# Patient Record
Sex: FEMALE | Race: WHITE | NOT HISPANIC OR LATINO | Employment: OTHER | URBAN - METROPOLITAN AREA
[De-identification: names, ages, dates, MRNs, and addresses within clinical notes are randomized per-mention and may not be internally consistent; named-entity substitution may affect disease eponyms.]

---

## 2017-05-16 ENCOUNTER — APPOINTMENT (OUTPATIENT)
Dept: LAB | Facility: CLINIC | Age: 57
End: 2017-05-16
Attending: INTERNAL MEDICINE
Payer: COMMERCIAL

## 2017-05-16 ENCOUNTER — OFFICE VISIT (OUTPATIENT)
Dept: TRANSPLANT | Facility: CLINIC | Age: 57
End: 2017-05-16
Attending: INTERNAL MEDICINE
Payer: COMMERCIAL

## 2017-05-16 VITALS
SYSTOLIC BLOOD PRESSURE: 127 MMHG | DIASTOLIC BLOOD PRESSURE: 57 MMHG | WEIGHT: 138.4 LBS | RESPIRATION RATE: 18 BRPM | OXYGEN SATURATION: 99 % | HEART RATE: 63 BPM | BODY MASS INDEX: 23.94 KG/M2 | TEMPERATURE: 98.6 F

## 2017-05-16 DIAGNOSIS — C77.9 MALIGNANT MELANOMA METASTATIC TO LYMPH NODE (H): Primary | ICD-10-CM

## 2017-05-16 LAB
ALBUMIN SERPL-MCNC: 3.1 G/DL (ref 3.4–5)
ALP SERPL-CCNC: 46 U/L (ref 40–150)
ALT SERPL W P-5'-P-CCNC: 22 U/L (ref 0–50)
ANION GAP SERPL CALCULATED.3IONS-SCNC: 8 MMOL/L (ref 3–14)
AST SERPL W P-5'-P-CCNC: 17 U/L (ref 0–45)
BASOPHILS # BLD AUTO: 0 10E9/L (ref 0–0.2)
BASOPHILS NFR BLD AUTO: 0.5 %
BILIRUB SERPL-MCNC: 0.5 MG/DL (ref 0.2–1.3)
BUN SERPL-MCNC: 11 MG/DL (ref 7–30)
CALCIUM SERPL-MCNC: 9.3 MG/DL (ref 8.5–10.1)
CHLORIDE SERPL-SCNC: 106 MMOL/L (ref 94–109)
CO2 SERPL-SCNC: 23 MMOL/L (ref 20–32)
CREAT SERPL-MCNC: 0.56 MG/DL (ref 0.52–1.04)
DIFFERENTIAL METHOD BLD: NORMAL
EOSINOPHIL # BLD AUTO: 0.1 10E9/L (ref 0–0.7)
EOSINOPHIL NFR BLD AUTO: 2.3 %
ERYTHROCYTE [DISTWIDTH] IN BLOOD BY AUTOMATED COUNT: 12.4 % (ref 10–15)
GFR SERPL CREATININE-BSD FRML MDRD: ABNORMAL ML/MIN/1.7M2
GLUCOSE SERPL-MCNC: 79 MG/DL (ref 70–99)
HCT VFR BLD AUTO: 37.8 % (ref 35–47)
HGB BLD-MCNC: 12.5 G/DL (ref 11.7–15.7)
IMM GRANULOCYTES # BLD: 0 10E9/L (ref 0–0.4)
IMM GRANULOCYTES NFR BLD: 0.3 %
LDH SERPL L TO P-CCNC: 136 U/L (ref 81–234)
LYMPHOCYTES # BLD AUTO: 1.3 10E9/L (ref 0.8–5.3)
LYMPHOCYTES NFR BLD AUTO: 20.8 %
MCH RBC QN AUTO: 29.8 PG (ref 26.5–33)
MCHC RBC AUTO-ENTMCNC: 33.1 G/DL (ref 31.5–36.5)
MCV RBC AUTO: 90 FL (ref 78–100)
MONOCYTES # BLD AUTO: 0.6 10E9/L (ref 0–1.3)
MONOCYTES NFR BLD AUTO: 9.6 %
NEUTROPHILS # BLD AUTO: 4 10E9/L (ref 1.6–8.3)
NEUTROPHILS NFR BLD AUTO: 66.5 %
NRBC # BLD AUTO: 0 10*3/UL
NRBC BLD AUTO-RTO: 0 /100
PLATELET # BLD AUTO: 227 10E9/L (ref 150–450)
POTASSIUM SERPL-SCNC: 3.8 MMOL/L (ref 3.4–5.3)
PROT SERPL-MCNC: 6.2 G/DL (ref 6.8–8.8)
RBC # BLD AUTO: 4.19 10E12/L (ref 3.8–5.2)
SODIUM SERPL-SCNC: 138 MMOL/L (ref 133–144)
WBC # BLD AUTO: 6 10E9/L (ref 4–11)

## 2017-05-16 PROCEDURE — 80053 COMPREHEN METABOLIC PANEL: CPT | Performed by: INTERNAL MEDICINE

## 2017-05-16 PROCEDURE — 85025 COMPLETE CBC W/AUTO DIFF WBC: CPT | Performed by: INTERNAL MEDICINE

## 2017-05-16 PROCEDURE — 99212 OFFICE O/P EST SF 10 MIN: CPT | Mod: ZF

## 2017-05-16 PROCEDURE — 83615 LACTATE (LD) (LDH) ENZYME: CPT | Performed by: INTERNAL MEDICINE

## 2017-05-16 ASSESSMENT — PAIN SCALES - GENERAL: PAINLEVEL: NO PAIN (0)

## 2017-05-16 NOTE — NURSING NOTE
"Oncology Rooming Note    May 16, 2017 3:20 PM   Kiran Espino is a 56 year old female who presents for:    Chief Complaint   Patient presents with     Blood Draw     Pt with 22ga IV angio already placed from CT scan, labs drawn from IV, IV discontinued, vitals completed, MD appt not until 3pm, instructed to check back in for appointment when she returns to clinic.     Oncology Clinic Visit     Patient with Melanoma here for provider visit and lab review      Initial Vitals: /57 (BP Location: Right arm)  Pulse 63  Temp 98.6  F (37  C) (Oral)  Resp 18  Wt 62.8 kg (138 lb 6.4 oz)  SpO2 99%  BMI 23.94 kg/m2 Estimated body mass index is 23.94 kg/(m^2) as calculated from the following:    Height as of 11/9/16: 1.619 m (5' 3.75\").    Weight as of this encounter: 62.8 kg (138 lb 6.4 oz). Body surface area is 1.68 meters squared.  No Pain (0) Comment: Data Unavailable   No LMP recorded.  Allergies reviewed: Yes  Medications reviewed: Yes    Medications: Medication refills not needed today.  Pharmacy name entered into [employer]):    [facility] - [place], [place] - [address] AT [facility] [address] & [facility] - [place], [place] - [address] [place] [address]    Clinical concerns:     5 minutes for nursing intake (face to face time)     Thelma Perez CMA              "

## 2017-05-16 NOTE — NURSING NOTE
Chief Complaint   Patient presents with     Blood Draw     Pt with 22ga IV angio already placed from CT scan, labs drawn from IV, IV discontinued, vitals completed, MD appt not until 3pm, instructed to check back in for appointment when she returns to clinic.   Emilia Adams,RN

## 2017-05-16 NOTE — MR AVS SNAPSHOT
After Visit Summary   5/16/2017    Kiran Espino    MRN: [id number]           Patient Information     Date Of Birth          1960        Visit Information        Provider Department      5/16/2017 3:00 PM Tashi Becker MD [facility] Blood and Marrow Transplant        Today's Diagnoses     Malignant melanoma metastatic to lymph node (H)    -  1          [facility] ([facility])  [address] [place] [id number]  Phone: [phone]  Clinic Hours:   Monday-Thursday: 7am to 7pm   Friday: 7am to 5:30pm   Weekends and holidays:    8am to noon (in general)  If your fever is 100.5  or greater,   call the clinic.  After hours call the   hospital at [phone] or   [phone]. Ask for the BMT   fellow on-call            Follow-ups after your visit        Future tests that were ordered for you today     Open Future Orders        Priority Expected Expires Ordered    CBC with platelets differential Routine 11/7/2017 5/16/2018 5/16/2017    Comprehensive metabolic panel Routine 11/7/2017 5/16/2018 5/16/2017    Lactate Dehydrogenase Routine 11/7/2017 5/16/2018 5/16/2017    CT Chest Abdomen Pelvis w/o & w Contrast Routine 11/7/2017 5/16/2018 5/16/2017            Who to contact     If you have questions or need follow up information about today's clinic visit or your schedule please contact [facility] B[facility]OD [facility] M[facility]OW TRANSPLANT directly at [phone].  Normal or non-critical lab and imaging results will be communicated to you by MyChart, letter or phone within 4 business days after the clinic has received the results. If you do not hear from us within 7 days, please contact the clinic through [employer] or phone. If you have a critical or abnormal lab result, we will notify you by phone as soon as possible.  Submit refill requests through [employer] or call your pharmacy and they will forward the refill request to us. Please allow 3 business days for your refill to be completed.           Additional Information About Your Visit        [employer] Information     [employer] ([employer]) gives you secure access to your electronic health record. If you see a primary care provider, you can also send messages to your care team and make appointments. If you have questions, please call your primary care clinic.  If you do not have a primary care provider, please call [phone] and they will assist you.        Care EveryWhere ID     This is your Care EveryWhere ID. This could be used by other organizations to access your [facility] medical records  [id number]        Your Vitals Were     Pulse Temperature Respirations Pulse Oximetry BMI (Body Mass Index)       63 98.6  F (37  C) (Oral) 18 99% 23.94 kg/m2        Blood Pressure from Last 3 Encounters:   05/16/17 127/57   11/09/16 115/74   05/03/16 118/70    Weight from Last 3 Encounters:   05/16/17 62.8 kg (138 lb 6.4 oz)   11/09/16 59.8 kg (131 lb 12.8 oz)   05/03/16 63.1 kg (139 lb 3.2 oz)              We Performed the Following     CBC with platelets differential     Comprehensive metabolic panel     Lactate Dehydrogenase        Recent Review Flowsheet Data     BMT Recent Results Latest Ref Rng & Units 5/22/2014 10/16/2014 4/30/2015 11/6/2015 5/3/2016 11/9/2016 5/16/2017    WBC 4.0 - 11.0 10e9/L 6.3 6.8 7.5 6.8 7.8 6.2 6.0    Hemoglobin 11.7 - 15.7 g/dL 13.2 13.1 13.0 13.8 12.1 13.8 12.5    Platelet Count 150 - 450 10e9/L 264 241 313 272 265 281 227    Neutrophils (Absolute) 1.6 - 8.3 10e9/L 4.4 4.0 4.7 4.5 5.6 4.2 4.0    INR 0.86 - 1.14 - - - - - - -    Sodium 133 - 144 mmol/L 139 137 136 138 136 140 138    Potassium 3.4 - 5.3 mmol/L 4.1 4.2 4.0 4.2 3.9 4.5 3.8    Chloride 94 - 109 mmol/L 107 107 105 107 104 108 106    Glucose 70 - 99 mg/dL 59(L) 80 86 82 80 82 79    Urea Nitrogen 7 - 30 mg/dL 12 14 11 13 11 11 11    Creatinine 0.52 - 1.04 mg/dL 0.65 0.71 0.62 0.59 0.68 0.71 0.56    Calcium (Total) 8.5 - 10.1 mg/dL 10.8(H) 10.1 10.6(H) 10.0 9.4 10.6(H) 9.3    Protein  (Total) 6.8 - 8.8 g/dL 6.8 6.7(L) 6.8 7.0 6.2(L) 6.9 6.2(L)    Albumin 3.4 - 5.0 g/dL 3.9 3.4(L) 3.6 3.6 3.1(L) 3.5 3.1(L)    Alkaline Phosphatase 40 - 150 U/L 50 53 51 56 38(L) 45 46    AST 0 - 45 U/L 22 9 17 16 12 15 17    ALT 0 - 50 U/L 33 30 30 39 25 26 22    MCV 78 - 100 fl 89 89 88 89 90 89 90               Primary Care Provider Office Phone # Fax #    Tashi Becker [phone]       [address]    [facility] [address]        Thank you!     Thank you for choosing [facility] BLOOD AND MARROW TRANSPLANT  for your care. Our goal is always to provide you with excellent care. Hearing back from our patients is one way we can continue to improve our services. Please take a few minutes to complete the written survey that you may receive in the mail after your visit with us. Thank you!             Your Updated Medication List - Protect others around you: Learn how to safely use, store and throw away your medicines at www.disposemymeds.org.          This list is accurate as of: 5/16/17  3:43 PM.  Always use your most recent med list.                   Brand Name Dispense Instructions for use    AZURETTE PO      Take  by mouth daily.       CALCIUM 1200 PO      Take  by mouth daily.       iohexol 140 MG/ML Soln solution    OMNIPAQUE    50 mL    Mix entire bottle (50ml) of contast with 600ml (20 ounces) of water and drink half 2 hrs prior to CT scan and half 1 hr prior to scan       ranitidine 75 MG tablet   Generic drug:  ranitidine      Take 75 mg by mouth daily as needed       vitamin C 500 MG Chew      Take 1,000 mg by mouth daily.       vitamin E 400 UNIT capsule      Take  by mouth daily.

## 2017-05-16 NOTE — PROGRESS NOTES
Cutaneous Oncology Evaluation      Kiran Espino is a 56 year old female referred by Dr. Sifuentes for melanoma.      Melanoma diagnostic information:   08/2012, in the shower, noticed a lump in her left groin.  There was no pain associated with it, no induration, no redness.  She reportedly went on a trip to [place] after she initially noticed it.  This eventually grew, and then she saw Dr. Santillan, who performed an excisional biopsy.  The mass about 2.5 cm.  It came back consistent with melanoma.  The pathology was reviewed reportedly both at [facility], as well as here at the [facility].  On retrospect, she reportedly had a biopsy done at the [facility], which was a lentiginous junctional nevus.  The lymph node was 2 x 1.6 x 1.5 cm.  She was then referred to Dr. Sergio Raines, who then performed an excisional lymph node removal.  All the lymph nodes came back negative for melanoma.  In the interval, she also saw Dermatology, who did perform a thorough skin check, as well as an anorectal examination, and no suspicious lesions were found.  During this time frame, she was also evaluated at [facility] for a second opinion, who concurred with performing a lymph node removal from the inguinal area.  She also had a screening colonoscopy, Pap smear and mammogram, which were all negative.            HPI:  Please see my entry above for oncologic history.  Ms. Espino is doing very well. She was last seen here about 6 months ago.  She is doing extremely well.  She is doing routine skin exams.  No fevers, chills, sweats, weight loss, bone pain, nausea, vomiting, diarrhea, skin rash, or any other new symptoms.      ASSESSMENT AND PLAN:  Melanoma of unknown primary site - Ms. Espino  is a 56 year old female with a history of melanoma with unknown primary site. She underwent lymph node excision with 12/12 negative lymph nodes in 2012. She has since been followed with surveillance. Her scans today are negative for  any recurrent or metastatic disease. We discussed with her in detail the nature of melanoma and it is important to continues to have her twice a year skin assessments by dermatology. From a melanoma standpoint, her negative scans are encouraging. We will continue with every six month CT scans through 5 years.  Her next scan should be her final one, assuming she continues to do so well.    She knows to return sooner if any new or concerning symptoms.  Multiple questions answered.    ROS:   Skin: negative  Eyes: negative  Ears/Nose/Throat: negative  Respiratory: No shortness of breath, dyspnea on exertion, cough, or hemoptysis  Cardiovascular: negative  Gastrointestinal: negative  Genitourinary: negative  Musculoskeletal: negative  Neurologic: negative  Psychiatric: negative  Hematologic/Lymphatic/Immunologic: negative  Endocrine: negative        Past Medical History:   Diagnosis Date     Irregular heart beat     'benign' per pt     Malignant neoplasm (H)        Past Surgical History:   Procedure Laterality Date     ENDOSCOPIC DISSECTION FEMORAL LYMPH NODE  12/20/2012    Procedure: ENDOSCOPIC DISSECTION FEMORAL LYMPH NODE;  Left Endoscopic Femoral Lymph Node Dissection converted to open @ 1404;  Surgeon: Sergio Raines MD;  Location: [facility] OR     EXCISE MASS GROIN  11/5/2012    Procedure: EXCISE MASS GROIN;  Left Groin exploration, Excision lymph node biopsy;  Surgeon: Hedy Santillan MD;  Location: [facility] OR     HEAD & NECK SURGERY  2000    cystectomy on right        No family history on file.    Social History   Substance Use Topics     Smoking status: Never Smoker     Smokeless tobacco: Never Used     Alcohol use 3.5 oz/week     7 Glasses of wine per week      Comment: 7/wk         Allergies   Allergen Reactions     Nka [No Known Allergies]      Nkda [No Known Drug Allergies]         Current Outpatient Prescriptions   Medication Sig Dispense Refill     iohexol (OMNIPAQUE) 140 MG/ML SOLN Mix entire bottle (50ml) of  contast with 600ml (20 ounces) of water and drink half 2 hrs prior to CT scan and half 1 hr prior to scan 50 mL 3     ranitidine (ZANTAC 75) 75 MG tablet Take 75 mg by mouth daily as needed       [DISCONTINUED] warfarin (COUMADIN) 5 MG tablet Take by mouth daily TAKE 5 MG DAILY AND ON MONDAYS TAKE 7.5MG       Ascorbic Acid (VITAMIN C) 500 MG CHEW Take 1,000 mg by mouth daily.       vitamin E 400 UNIT capsule Take  by mouth daily.       Desogestrel-Ethinyl Estradiol (AZURETTE PO) Take  by mouth daily.       Calcium Carbonate-Vit D-Min (CALCIUM 1200 PO) Take  by mouth daily.           Physical Exam:     Vital Signs: /57 (BP Location: Right arm)  Pulse 63  Temp 98.6  F (37  C) (Oral)  Resp 18  Wt 62.8 kg (138 lb 6.4 oz)  SpO2 99%  BMI 23.94 kg/m2        KPS:  100%    General Appearance: healthy, alert and no distress  Eyes: PERRL, conjunctiva and lids normal, sclera nonicteric  Ears/Nose/M/Throat: Oral mucosa and posterior oropharynx normal, moist mucous membranes  Neck supple, non-tender, free range of motion, no adenopathy  Cardio/Vascular:regular rate and rhythm, normal S1 and S2, no murmur  Resp Effort And Auscultation: Normal - Clear to auscultation without rales, rhonchi, or wheezing.  GI: soft, nontender, bowel sounds present in all four quadrants, no hepatosplenomegaly  Lymphatics:no significant enlargement of lymph nodes globally   Musculoskeletal: Musculoskeletal normal  Edema: none  Skin: Skin color, texture, turgor normal. No rashes or lesions.  Neurologic: Gait normal. Reflexes normal and symmetric. Sensation grossly WNL.  Psych/Affect: Mood and affect are appropriate.  Vascular Access:  None        Active Problems:     Patient Active Problem List   Diagnosis     Melanoma (H)     Advance care planning     DVT (deep venous thrombosis) (H)       CBC with Differential    Recent Labs   Lab Test  05/16/17   1143  11/09/16   1120  05/03/16   1129   WBC  6.0  6.2  7.8   ANEU  4.0  4.2  5.6   ALYM  1.3   1.4  1.4   ANA MARIA  0.6  0.5  0.6   AEOS  0.1  0.1  0.1   HGB  12.5  13.8  12.1   HCT  37.8  40.9  36.3   PLT  227  281  265        Chemistries    Recent Labs   Lab Test  05/16/17   1143  11/09/16   1120  05/03/16   1129   NA  138  140  136   POTASSIUM  3.8  4.5  3.9   CHLORIDE  106  108  104   CO2  23  26  23   BUN  11  11  11   CR  0.56  0.71  0.68   GLC  79  82  80         LFTs    Recent Labs   Lab Test  05/16/17   1143  11/09/16   1120  05/03/16   1129   BILITOTAL  0.5  0.5  0.4   ALKPHOS  46  45  38*   AST  17  15  12   ALT  22  26  25   ALBUMIN  3.1*  3.5  3.1*        LDH    Recent Labs   Lab Test  05/16/17   1143  11/09/16   1120  05/03/16   1129   LDH  136  116  130        CT C/A/P    Results for orders placed in visit on 05/16/17   CT CHEST/ABDOMEN/PELVIS W CONTRAST    Status: Normal 5/16/2017    Narrative EXAMINATION: CT CHEST/ABDOMEN/PELVIS W CONTRAST, 5/16/2017 11:31 AM    TECHNIQUE:  Helical CT images from the thoracic inlet through the  symphysis pubis were obtained with contrast in the portal venous  phase. Arterial phase imaging through the liver. Contrast dose: Isovue  370 80 cc.    COMPARISON: 11/9/2016, 5/3/2016, 11/6/2015, 4/30/2015, 5/21/2014.    HISTORY: Surveillance scan for hx of melanoma of L groin, Malignant  melanoma of skin.    FINDINGS:  Chest:   Unchanged subcentimeter hypodensities in the right thyroid lobe. The  aortic branching pattern, heart size, pericardium, and esophagus are  unremarkable. The ascending aorta and main pulmonary artery are not  enlarged. No large central pulmonary embolism. Stable prominent  subcentimeter mediastinal lymphadenopathy, for example precarinal  lymph node which again measures 7 mm short axis. No lymphadenopathy in  the chest by size criteria.    The central tracheobronchial tree is patent. No pneumothorax or  pleural effusion. No focal airspace consolidation. Stable nodules  along the major fissures, likely intrafissural lymph nodes. Unchanged  tiny  nodules predominantly in the left upper lobe, stable since 2012.  No new or enlarging pulmonary nodules appreciated.    Abdomen and pelvis:   Unchanged subcentimeter hypodensity in hepatic segment 2, stable since  2012. Unchanged stable tiny enhancing focus in hepatic segment 7  (series 5 image 60), stable since at least 2015 and possibly  representing FNH, flash filling hemangioma or vascular shunt. The  gallbladder, pancreas, spleen, and adrenal glands are unremarkable.  Unchanged 3 mm nonobstructive calculi in the inferior pole the right  kidney and mid left kidney. Stable hypodense focus within the uterus  again measuring approximately 3.7 cm and consistent with uterine  fibroid.    No intra-abdominal free air or free fluid. No dilated loops of bowel.  The appendix is normal. Moderate stool burden. Normal caliber  abdominal aorta. The major abdominal vasculature is patent. No  lymphadenopathy in the abdomen or pelvis.    Bones and soft tissues:   Postsurgical changes of lymph node dissection in the left groin. No  acute or worrisome osseous lesions.      Impression IMPRESSION:   1. No evidence of metastatic disease in the chest, abdomen, or pelvis.    2. Nonobstructive nephrolithiasis bilaterally.    I have personally reviewed the examination and initial interpretation  and I agree with the findings.    SUSANA BLAKECECILIA              Kiran understood the above assessment and recommendations.  Multiple questions answered.  No barriers to learning identified.         Total time: 30 minutes  Counseling time: 20 minutes  Prolonged service: 0 minutes       Tashi Becker MD     ------------------------------------------------------------------------------------------------------------------------------------------------    Patient Care Team       Relationship Specialty Notifications Start End    No Ref-Primary, Physician PCP - General   5/20/14     Marion Werner RN Nurse Coordinator Oncology Admissions 11/25/13      Comment:  Phone: [phone], pager: [phone]

## 2017-07-29 ENCOUNTER — HEALTH MAINTENANCE LETTER (OUTPATIENT)
Age: 57
End: 2017-07-29

## 2017-08-17 ENCOUNTER — TRANSFERRED RECORDS (OUTPATIENT)
Dept: HEALTH INFORMATION MANAGEMENT | Facility: CLINIC | Age: 57
End: 2017-08-17

## 2017-11-07 ENCOUNTER — APPOINTMENT (OUTPATIENT)
Dept: LAB | Facility: CLINIC | Age: 57
End: 2017-11-07
Attending: INTERNAL MEDICINE
Payer: COMMERCIAL

## 2017-11-07 ENCOUNTER — OFFICE VISIT (OUTPATIENT)
Dept: TRANSPLANT | Facility: CLINIC | Age: 57
End: 2017-11-07
Attending: INTERNAL MEDICINE
Payer: COMMERCIAL

## 2017-11-07 VITALS
DIASTOLIC BLOOD PRESSURE: 63 MMHG | SYSTOLIC BLOOD PRESSURE: 114 MMHG | HEART RATE: 67 BPM | OXYGEN SATURATION: 100 % | TEMPERATURE: 97.3 F | WEIGHT: 133 LBS | BODY MASS INDEX: 23.01 KG/M2 | RESPIRATION RATE: 16 BRPM

## 2017-11-07 DIAGNOSIS — C77.9 MALIGNANT MELANOMA METASTATIC TO LYMPH NODE (H): ICD-10-CM

## 2017-11-07 LAB
ALBUMIN SERPL-MCNC: 3.6 G/DL (ref 3.4–5)
ALP SERPL-CCNC: 54 U/L (ref 40–150)
ALT SERPL W P-5'-P-CCNC: 30 U/L (ref 0–50)
ANION GAP SERPL CALCULATED.3IONS-SCNC: 6 MMOL/L (ref 3–14)
AST SERPL W P-5'-P-CCNC: 18 U/L (ref 0–45)
BASOPHILS # BLD AUTO: 0.1 10E9/L (ref 0–0.2)
BASOPHILS NFR BLD AUTO: 0.7 %
BILIRUB SERPL-MCNC: 0.4 MG/DL (ref 0.2–1.3)
BUN SERPL-MCNC: 10 MG/DL (ref 7–30)
CALCIUM SERPL-MCNC: 9.9 MG/DL (ref 8.5–10.1)
CHLORIDE SERPL-SCNC: 105 MMOL/L (ref 94–109)
CO2 SERPL-SCNC: 24 MMOL/L (ref 20–32)
CREAT SERPL-MCNC: 0.63 MG/DL (ref 0.52–1.04)
DIFFERENTIAL METHOD BLD: NORMAL
EOSINOPHIL # BLD AUTO: 0.1 10E9/L (ref 0–0.7)
EOSINOPHIL NFR BLD AUTO: 1.1 %
ERYTHROCYTE [DISTWIDTH] IN BLOOD BY AUTOMATED COUNT: 12.6 % (ref 10–15)
GFR SERPL CREATININE-BSD FRML MDRD: >90 ML/MIN/1.7M2
GLUCOSE SERPL-MCNC: 82 MG/DL (ref 70–99)
HCT VFR BLD AUTO: 41.2 % (ref 35–47)
HGB BLD-MCNC: 13.9 G/DL (ref 11.7–15.7)
IMM GRANULOCYTES # BLD: 0 10E9/L (ref 0–0.4)
IMM GRANULOCYTES NFR BLD: 0.1 %
LDH SERPL L TO P-CCNC: 134 U/L (ref 81–234)
LYMPHOCYTES # BLD AUTO: 1.6 10E9/L (ref 0.8–5.3)
LYMPHOCYTES NFR BLD AUTO: 23.1 %
MCH RBC QN AUTO: 30 PG (ref 26.5–33)
MCHC RBC AUTO-ENTMCNC: 33.7 G/DL (ref 31.5–36.5)
MCV RBC AUTO: 89 FL (ref 78–100)
MONOCYTES # BLD AUTO: 0.6 10E9/L (ref 0–1.3)
MONOCYTES NFR BLD AUTO: 8 %
NEUTROPHILS # BLD AUTO: 4.7 10E9/L (ref 1.6–8.3)
NEUTROPHILS NFR BLD AUTO: 67 %
NRBC # BLD AUTO: 0 10*3/UL
NRBC BLD AUTO-RTO: 0 /100
PLATELET # BLD AUTO: 259 10E9/L (ref 150–450)
POTASSIUM SERPL-SCNC: 4.1 MMOL/L (ref 3.4–5.3)
PROT SERPL-MCNC: 6.9 G/DL (ref 6.8–8.8)
RBC # BLD AUTO: 4.63 10E12/L (ref 3.8–5.2)
SODIUM SERPL-SCNC: 134 MMOL/L (ref 133–144)
WBC # BLD AUTO: 7 10E9/L (ref 4–11)

## 2017-11-07 PROCEDURE — 83615 LACTATE (LD) (LDH) ENZYME: CPT | Performed by: INTERNAL MEDICINE

## 2017-11-07 PROCEDURE — 80053 COMPREHEN METABOLIC PANEL: CPT | Performed by: INTERNAL MEDICINE

## 2017-11-07 PROCEDURE — 85025 COMPLETE CBC W/AUTO DIFF WBC: CPT | Performed by: INTERNAL MEDICINE

## 2017-11-07 PROCEDURE — 36415 COLL VENOUS BLD VENIPUNCTURE: CPT

## 2017-11-07 PROCEDURE — 99211 OFF/OP EST MAY X REQ PHY/QHP: CPT | Mod: ZF

## 2017-11-07 ASSESSMENT — PAIN SCALES - GENERAL: PAINLEVEL: NO PAIN (0)

## 2017-11-07 NOTE — PROGRESS NOTES
Cutaneous Oncology Evaluation      Kiran Espino is a 57 year old female referred by Dr. Sifuentes for melanoma.      Melanoma diagnostic information:   08/2012, in the shower, noticed a lump in her left groin.  There was no pain associated with it, no induration, no redness.  She reportedly went on a trip to [place] after she initially noticed it.  This eventually grew, and then she saw Dr. Santillan, who performed an excisional biopsy.  The mass about 2.5 cm.  It came back consistent with melanoma.  The pathology was reviewed reportedly both at [place], as well as here at the [facility].  On retrospect, she reportedly had a biopsy done at the [facility], which was a lentiginous junctional nevus.  The lymph node was 2 x 1.6 x 1.5 cm.  She was then referred to Dr. Sergio Raines, who then performed an excisional lymph node removal.  All the lymph nodes came back negative for melanoma.  In the interval, she also saw Dermatology, who did perform a thorough skin check, as well as an anorectal examination, and no suspicious lesions were found.  During this time frame, she was also evaluated at [facility] for a second opinion, who concurred with performing a lymph node removal from the inguinal area.  She also had a screening colonoscopy, Pap smear and mammogram, which were all negative.            HPI:  Please see my entry above for oncologic history.  Ms. Espino is doing very well. She was last seen here 6 months ago.  She is doing extremely well.  She is doing routine skin exams.  No fevers, chills, sweats, weight loss, bone pain, nausea, vomiting, diarrhea, skin rash, or any other new symptoms.      ASSESSMENT AND PLAN:  Melanoma of unknown primary site - Ms. Espino  is a 56 year old female with a history of melanoma with unknown primary site. She underwent lymph node excision with 12/12 negative lymph nodes in 2012. She has since been followed with surveillance. Her scans today are negative for any  recurrent or metastatic disease. We discussed with her in detail the nature of melanoma and it is important to continue at least yearly dermatology visits.  Since she has completed 5 years of surveillance, we will not plan any further scans and labs.  She may follow up with her PCP from this point but may always return to us if new concerns arise.      Multiple questions answered.  It was a pleasure seeing Kiran today.    ROS:   Skin: negative  Eyes: negative  Ears/Nose/Throat: negative  Respiratory: No shortness of breath, dyspnea on exertion, cough, or hemoptysis  Cardiovascular: negative  Gastrointestinal: negative  Genitourinary: negative  Musculoskeletal: negative  Neurologic: negative  Psychiatric: negative  Hematologic/Lymphatic/Immunologic: negative  Endocrine: negative        Past Medical History:   Diagnosis Date     Irregular heart beat     'benign' per pt     Malignant neoplasm (H)        Past Surgical History:   Procedure Laterality Date     ENDOSCOPIC DISSECTION FEMORAL LYMPH NODE  12/20/2012    Procedure: ENDOSCOPIC DISSECTION FEMORAL LYMPH NODE;  Left Endoscopic Femoral Lymph Node Dissection converted to open @ 1404;  Surgeon: Sergio Raines MD;  Location: [facility] OR     EXCISE MASS GROIN  11/5/2012    Procedure: EXCISE MASS GROIN;  Left Groin exploration, Excision lymph node biopsy;  Surgeon: Hedy Santillan MD;  Location: [facility] OR     HEAD & NECK SURGERY  2000    cystectomy on right        No family history on file.    Social History   Substance Use Topics     Smoking status: Never Smoker     Smokeless tobacco: Never Used     Alcohol use 3.5 oz/week     7 Glasses of wine per week      Comment: 7/wk         Allergies   Allergen Reactions     Nka [No Known Allergies]      Nkda [No Known Drug Allergies]         Current Outpatient Prescriptions   Medication Sig Dispense Refill     iohexol (OMNIPAQUE) 140 MG/ML SOLN Mix entire bottle (50ml) of contast with 600ml (20 ounces) of water and drink half 2 hrs  prior to CT scan and half 1 hr prior to scan 50 mL 3     ranitidine (ZANTAC 75) 75 MG tablet Take 75 mg by mouth daily as needed       [DISCONTINUED] warfarin (COUMADIN) 5 MG tablet Take by mouth daily TAKE 5 MG DAILY AND ON MONDAYS TAKE 7.5MG       Ascorbic Acid (VITAMIN C) 500 MG CHEW Take 1,000 mg by mouth daily.       vitamin E 400 UNIT capsule Take  by mouth daily.       Desogestrel-Ethinyl Estradiol (AZURETTE PO) Take  by mouth daily.       Calcium Carbonate-Vit D-Min (CALCIUM 1200 PO) Take  by mouth daily.           Physical Exam:     Vital Signs: /63 (BP Location: Right arm, Patient Position: Sitting, Cuff Size: Adult Regular)  Pulse 67  Temp 97.3  F (36.3  C) (Oral)  Resp 16  Wt 60.3 kg (133 lb)  SpO2 100%  BMI 23.01 kg/m2        KPS:  100%    General Appearance: healthy, alert and no distress  Eyes: PERRL, conjunctiva and lids normal, sclera nonicteric  Ears/Nose/M/Throat: Oral mucosa and posterior oropharynx normal, moist mucous membranes  Neck supple, non-tender, free range of motion, no adenopathy  Cardio/Vascular:regular rate and rhythm, normal S1 and S2, no murmur  Resp Effort And Auscultation: Normal - Clear to auscultation without rales, rhonchi, or wheezing.  GI: soft, nontender, bowel sounds present in all four quadrants, no hepatosplenomegaly  Lymphatics:no significant enlargement of lymph nodes globally   Musculoskeletal: Musculoskeletal normal  Edema: none  Skin: Skin color, texture, turgor normal. No rashes or lesions.  Neurologic: Gait normal. Reflexes normal and symmetric. Sensation grossly WNL.  Psych/Affect: Mood and affect are appropriate.  Vascular Access:  None        Active Problems:     Patient Active Problem List   Diagnosis     Melanoma (H)     Advance care planning     DVT (deep venous thrombosis) (H)       CBC with Differential    Recent Labs   Lab Test  11/07/17   1237  05/16/17   1143  11/09/16   1120   WBC  7.0  6.0  6.2   ANEU  4.7  4.0  4.2   ALYM  1.6  1.3  1.4    ANA MARIA  0.6  0.6  0.5   AEOS  0.1  0.1  0.1   HGB  13.9  12.5  13.8   HCT  41.2  37.8  40.9   PLT  259  227  281        Chemistries    Recent Labs   Lab Test  11/07/17   1237  05/16/17   1143  11/09/16   1120   NA  134  138  140   POTASSIUM  4.1  3.8  4.5   CHLORIDE  105  106  108   CO2  24  23  26   BUN  10  11  11   CR  0.63  0.56  0.71   GLC  82  79  82         LFTs    Recent Labs   Lab Test  11/07/17   1237  05/16/17   1143  11/09/16   1120   BILITOTAL  0.4  0.5  0.5   ALKPHOS  54  46  45   AST  18  17  15   ALT  30  22  26   ALBUMIN  3.6  3.1*  3.5        LDH    Recent Labs   Lab Test  11/07/17   1237  05/16/17   1143  11/09/16   1120   LDH  134  136  116        CT C/A/P    Results for orders placed in visit on 11/07/17   CT CHEST/ABDOMEN/PELVIS W CONTRAST    Status: Normal 11/7/2017    Narrative EXAMINATION: CT CHEST/ABDOMEN/PELVIS W CONTRAST, 11/7/2017 1:22 PM    TECHNIQUE:  Helical CT images from the thoracic inlet through the  symphysis pubis were obtained  with contrast. Contrast dose: Isovue  370 81cc    COMPARISON: Multiple prior exams, the most recent dated 5/16/2017.    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 The heart is normal in size without significant pericardial  effusion. No pathologically enlarged axillary, hilar, or mediastinal  lymph nodes. Small mediastinal lymph nodes are not significantly  changed.    Stable tiny nodules along the fissures, likely intrafissural lymph  nodes. Unchanged tiny nodules predominantly in the left upper lobe,  stable since 2012. No new or enlarging pulmonary nodules appreciated.    No new suspicious liver lesions. Unchanged subcentimeter subcapsular  hypodensity in hepatic segment 2 anteriorly and subcentimeter focus of  enhancement in segment 7. The gallbladder, spleen, pancreas, and  adrenal  glands remain within normal limits. Bilateral nonobstructive  nephrolithiasis. The kidneys are otherwise unremarkable. No bowel  dilatation or adjacent inflammatory change. Normal caliber appendix.  Uterine fibroids. No adnexal masses.    Surgical clips in the left inguinal region. No pathologically enlarged  lymph nodes within the abdomen or pelvis. No free intraperitoneal air  or fluid.    No acute osseous abnormality or suspicious bony lesions.      Impression IMPRESSION:   1. Stable exam without evidence of metastatic disease in the chest,  abdomen, or pelvis.   2. Nonobstructive nephrolithiasis.    MD Kiran DAVISON understood the above assessment and recommendations.  Multiple questions answered.  No barriers to learning identified.         Total time: 30 minutes  Counseling time: 20 minutes  Prolonged service: 0 minutes       Tashi Becker MD     ------------------------------------------------------------------------------------------------------------------------------------------------    Patient Care Team       Relationship Specialty Notifications Start End    No Ref-Primary, Physician PCP - General   5/20/14     Marion Werner RN Nurse Coordinator Oncology Admissions 11/25/13     Comment:  Phone: [phone], pager: [phone]

## 2017-11-07 NOTE — NURSING NOTE
Chief Complaint   Patient presents with     Blood Draw     labs drawn with IV start by rn.  vs taken.     Labs drawn with IV start by RN.  Vital signs taken.   Kandi Robertson RN

## 2017-11-07 NOTE — NURSING NOTE
"Oncology Rooming Note    November 7, 2017 4:01 PM   Kiran Espino is a 57 year old female who presents for:    Chief Complaint   Patient presents with     Blood Draw     labs drawn with IV start by rn.  vs taken.     Oncology Clinic Visit     MELANOMA     Initial Vitals: /63 (BP Location: Right arm, Patient Position: Sitting, Cuff Size: Adult Regular)  Pulse 67  Temp 97.3  F (36.3  C) (Oral)  Resp 16  Wt 60.3 kg (133 lb)  SpO2 100%  BMI 23.01 kg/m2 Estimated body mass index is 23.01 kg/(m^2) as calculated from the following:    Height as of 11/9/16: 1.619 m (5' 3.75\").    Weight as of this encounter: 60.3 kg (133 lb). Body surface area is 1.65 meters squared.  No Pain (0) Comment: Data Unavailable   No LMP recorded.  Allergies reviewed: Yes  Medications reviewed: Yes    Medications: Medication refills not needed today.  Pharmacy name entered into [facility]:    [facility] [address] [place] [address] [address] AT [facility] & [facility] - [place], [place] - [address] [place] [address]    Clinical concerns: n/a     3 minutes for nursing intake (face to face time)     LETICIA KING CMA              "

## 2017-11-07 NOTE — MR AVS SNAPSHOT
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  After hours call the   hospital at [phone] or   [phone]. Ask for the BMT   fellow on-call            Follow-ups after your visit        Who to contact     If you have questions or need follow up information about today's clinic visit or your schedule please contact [facility] directly at [phone].  Normal or non-critical lab and imaging results will be communicated to you by Spottedhart, letter or phone within 4 business days after the clinic has received the results. If you do not hear from us within 7 days, please contact the clinic through [facility] or phone. If you have a critical or abnormal lab result, we will notify you by phone as soon as possible.  Submit refill requests through [facility] or call your pharmacy and they will forward the refill request to us. Please allow 3 business days for your refill to be completed.          Additional Information About Your Visit        Spottedhart Information     [facility] gives you secure access to your electronic health record. If you see a primary care provider, you can also send messages to your care team and make appointments. If you have questions, please call your primary care clinic.  If you do not have a primary care provider, please call [phone] and they will assist you.        Care  EveryWhere ID     This is your Care EveryWhere ID. This could be used by other organizations to access your [facility] medical records  [id number]        Your Vitals Were     Pulse Temperature Respirations Pulse Oximetry BMI (Body Mass Index)       67 97.3  F (36.3  C) (Oral) 16 100% 23.01 kg/m2        Blood Pressure from Last 3 Encounters:   11/07/17 114/63   05/16/17 127/57   11/09/16 115/74    Weight from Last 3 Encounters:   11/07/17 60.3 kg (133 lb)   05/16/17 62.8 kg (138 lb 6.4 oz)   11/09/16 59.8 kg (131 lb 12.8 oz)              We Performed the Following     CBC with platelets differential     Comprehensive metabolic panel     Lactate Dehydrogenase        Recent Review Flowsheet Data     BMT Recent Results Latest Ref Rng & Units 10/16/2014 4/30/2015 11/6/2015 5/3/2016 11/9/2016 5/16/2017 11/7/2017    WBC 4.0 - 11.0 10e9/L 6.8 7.5 6.8 7.8 6.2 6.0 7.0    Hemoglobin 11.7 - 15.7 g/dL 13.1 13.0 13.8 12.1 13.8 12.5 13.9    Platelet Count 150 - 450 10e9/L 241 313 272 265 281 227 259    Neutrophils (Absolute) 1.6 - 8.3 10e9/L 4.0 4.7 4.5 5.6 4.2 4.0 4.7    INR 0.86 - 1.14 - - - - - - -    Sodium 133 - 144 mmol/L 137 136 138 136 140 138 134    Potassium 3.4 - 5.3 mmol/L 4.2 4.0 4.2 3.9 4.5 3.8 4.1    Chloride 94 - 109 mmol/L 107 105 107 104 108 106 105    Glucose 70 - 99 mg/dL 80 86 82 80 82 79 82    Urea Nitrogen 7 - 30 mg/dL 14 11 13 11 11 11 10    Creatinine 0.52 - 1.04 mg/dL 0.71 0.62 0.59 0.68 0.71 0.56 0.63    Calcium (Total) 8.5 - 10.1 mg/dL 10.1 10.6(H) 10.0 9.4 10.6(H) 9.3 9.9    Protein (Total) 6.8 - 8.8 g/dL 6.7(L) 6.8 7.0 6.2(L) 6.9 6.2(L) 6.9    Albumin 3.4 - 5.0 g/dL 3.4(L) 3.6 3.6 3.1(L) 3.5 3.1(L) 3.6    Alkaline Phosphatase 40 - 150 U/L 53 51 56 38(L) 45 46 54    AST 0 - 45 U/L 9 17 16 12 15 17 18    ALT 0 - 50 U/L 30 30 39 25 26 22 30    MCV 78 - 100 fl 89 88 89 90 89 90 89               Primary Care Provider Office Phone # Fax #    Tashi Becker [phone]       [address] [place] [address]  [address] [facility] [address]  [facility] [address]        Equal Access to Services     ROBER MENARD : Hadfabrice aad ku hadkumarcelestine Lizali, wakitda mikhailfacundoha, qaynesmeera rodrigezmajuan golden. So [facility] [phone].    ATENCIÓN: Si habla español, tiene a garza disposición servicios gratuitos de asistencia lingüística. Llame al [phone].    We comply with applicable federal civil rights laws and [place] laws. We do not discriminate on the basis of race, color, national origin, age, disability, sex, sexual orientation, or gender identity.            Thank you!     Thank you for choosing [facility]  for your care. Our goal is always to provide you with excellent care. Hearing back from our patients is one way we can continue to improve our services. Please take a few minutes to complete the written survey that you may receive in the mail after your visit with us. Thank you!             Your Updated Medication List - Protect others around you: Learn how to safely use, store and throw away your medicines at www.disposemymeds.org.          This list is accurate as of: 11/7/17  4:56 PM.  Always use your most recent med list.                   Brand Name Dispense Instructions for use Diagnosis    AZURETTE PO      Take  by mouth daily.        CALCIUM 1200 PO      Take  by mouth daily.        iohexol 140 MG/ML Soln solution    OMNIPAQUE    50 mL    Mix entire bottle (50ml) of contast with 600ml (20 ounces) of water and drink half 2 hrs prior to CT scan and half 1 hr prior to scan    Metastatic malignant melanoma (H)       ranitidine 75 MG tablet   Generic drug:  ranitidine      Take 75 mg by mouth daily as needed        vitamin C 500 MG Chew      Take 1,000 mg by mouth daily.        vitamin E 400 UNIT capsule      Take  by mouth daily.

## 2017-11-09 ENCOUNTER — MYC MEDICAL ADVICE (OUTPATIENT)
Dept: TRANSPLANT | Facility: CLINIC | Age: 57
End: 2017-11-09

## 2017-12-14 ENCOUNTER — TRANSFERRED RECORDS (OUTPATIENT)
Dept: HEALTH INFORMATION MANAGEMENT | Facility: CLINIC | Age: 57
End: 2017-12-14

## 2018-01-05 ENCOUNTER — DOCUMENTATION ONLY (OUTPATIENT)
Dept: TRANSPLANT | Facility: CLINIC | Age: 58
End: 2018-01-05

## 2018-01-25 ENCOUNTER — HOSPITAL ENCOUNTER (OUTPATIENT)
Dept: MAMMOGRAPHY | Facility: CLINIC | Age: 58
Discharge: HOME | End: 2018-01-25
Attending: OBSTETRICS & GYNECOLOGY | Admitting: OBSTETRICS & GYNECOLOGY
Payer: COMMERCIAL

## 2018-01-25 DIAGNOSIS — Z12.31 VISIT FOR SCREENING MAMMOGRAM: ICD-10-CM

## 2018-01-25 PROCEDURE — 77067 SCR MAMMO BI INCL CAD: CPT

## 2018-02-15 ENCOUNTER — TRANSFERRED RECORDS (OUTPATIENT)
Dept: HEALTH INFORMATION MANAGEMENT | Facility: CLINIC | Age: 58
End: 2018-02-15

## 2019-02-28 ENCOUNTER — HOSPITAL ENCOUNTER (OUTPATIENT)
Dept: MAMMOGRAPHY | Facility: CLINIC | Age: 59
Discharge: HOME | End: 2019-02-28
Attending: OBSTETRICS & GYNECOLOGY | Admitting: OBSTETRICS & GYNECOLOGY
Payer: COMMERCIAL

## 2019-02-28 ENCOUNTER — TRANSFERRED RECORDS (OUTPATIENT)
Dept: HEALTH INFORMATION MANAGEMENT | Facility: CLINIC | Age: 59
End: 2019-02-28

## 2019-02-28 DIAGNOSIS — Z12.31 VISIT FOR SCREENING MAMMOGRAM: ICD-10-CM

## 2019-02-28 PROCEDURE — 77067 SCR MAMMO BI INCL CAD: CPT

## 2019-06-11 NOTE — PROGRESS NOTES
"Subjective     Kiran Espino is a 58 year old female who presents to clinic today for the following health issues:    HPI   Wrist Pain    Onset: 1 month    Description:   Location: right wrist  Character: Sharp    Intensity: moderate, severe    Progression of Symptoms: worse    Accompanying Signs & Symptoms:  Other symptoms: none    History:   Previous similar pain: no  Family history: Mother had a similar problem in the past which was treated with an injection.       Precipitating factors:   Trauma or overuse: no     Alleviating factors:  Improved by: nothing    Therapies Tried and outcome: Tylenol & ibuprofen    Kiran reports that certain angled rotations with her wrist causes moderate pain.  she describes that at times her shoulder movements would cause a more mild sensation or discomfort. She suspected that she slept on her hand incorrectly but the pain has persisted for a month. Denies elbows, knees, or other joints concerns -- doesn't play tennis or racket sports however does workout.     Kiran is unsure if it's related to her wrist but she also notes having swelling in her bilateral fingers which causes difficulty when curling her fingers - describing symptoms as \"locking\". Episodes occur after waking up.      Reviewed and updated as needed this visit by provider:  Surg Hx       Review of Systems   Constitutional, HEENT, cardiovascular, pulmonary, GI, , musculoskeletal, neuro, skin, endocrine and psych systems are negative, except as otherwise noted.  This document serves as a record of the services and decisions personally performed and made by Eyad Ford MD. It was created on his behalf by Taye Meyer, a trained medical scribe. The creation of this document is based the provider's statements to the medical scribe.  Scribe Taye Meyer 1:34 PM, June 13, 2019     Objective   /70   Pulse 73   Temp 98  F (36.7  C) (Oral)   Ht 1.619 m (5' 3.75\")   Wt 57.6 kg (127 lb)   SpO2 98%   BMI 21.97 kg/m   Body " mass index is 21.97 kg/m .  Physical Exam   GENERAL: healthy, alert, well nourished, well hydrated, no distress  HENT: ear canals- normal; TMs- normal; Nose- normal; Mouth- no ulcers, no lesions  NECK: no tenderness, no adenopathy, no asymmetry, no masses, no stiffness; thyroid- normal to palpation  RESP: lungs clear to auscultation - no rales, no rhonchi, no wheezes  CV: regular rates and rhythm, normal S1 S2, no S3 or S4 and no murmur, no click or rub -  ABDOMEN: soft, no tenderness, no  hepatosplenomegaly, no masses, normal bowel sounds  MS: minor to mild stiffness of bilateral shoulders, mild synovitis of the right 4th PIP; moderate wrist tendon tenderness in the radial side, extremities- no gross deformities noted, no edema  SKIN: no suspicious lesions, no rashes  NEURO: strength and tone- normal, sensory exam- grossly normal, mentation- intact, speech- normal, reflexes- symmetric      Assessment & Plan   Kiran was seen today for musculoskeletal problem.    Diagnoses and all orders for this visit:    Radial styloid tenosynovitis of right hand - New diagnosis, wrist splint given, and begin Anaprox. Further symptomatic cares discussed.  -     WRIST SPLINT  -     naproxen sodium (ANAPROX) 220 MG tablet; Take 1 tablet (220 mg) by mouth 2 times daily (with meals) for 7 days    See Patient Instructions    Return in about 3 weeks (around 7/4/2019) for recheck.     The information in this document, created by the medical scribe for me, accurately reflects the services I personally performed and the decisions made by me. I have reviewed and approved this document for accuracy prior to leaving the patient care area.  1:54 PM, 06/13/19        Frantz Ford MD   Pager - [phone]  [facility] [place]

## 2019-06-13 ENCOUNTER — OFFICE VISIT (OUTPATIENT)
Dept: FAMILY MEDICINE | Facility: CLINIC | Age: 59
End: 2019-06-13
Payer: COMMERCIAL

## 2019-06-13 VITALS
OXYGEN SATURATION: 98 % | DIASTOLIC BLOOD PRESSURE: 70 MMHG | HEART RATE: 73 BPM | HEIGHT: 64 IN | TEMPERATURE: 98 F | BODY MASS INDEX: 21.68 KG/M2 | SYSTOLIC BLOOD PRESSURE: 100 MMHG | WEIGHT: 127 LBS

## 2019-06-13 DIAGNOSIS — M65.4 RADIAL STYLOID TENOSYNOVITIS OF RIGHT HAND: Primary | ICD-10-CM

## 2019-06-13 PROCEDURE — 99203 OFFICE O/P NEW LOW 30 MIN: CPT | Performed by: FAMILY MEDICINE

## 2019-06-13 RX ORDER — NAPROXEN SODIUM 220 MG
220 TABLET ORAL 2 TIMES DAILY WITH MEALS
Qty: 14 TABLET | Refills: 0 | COMMUNITY
Start: 2019-06-13 | End: 2019-06-25

## 2019-06-13 ASSESSMENT — MIFFLIN-ST. JEOR: SCORE: 1147.1

## 2019-06-13 NOTE — Clinical Note
Please abstract the following data from this visit with this patient into the appropriate field in Epic:Pap smear done on this date: 10/01/2019 (approximately), by this group: [facility], results were normal.

## 2019-06-13 NOTE — LETTER
[address] [address]  Phone: [phone]  Fax: [phone]  June 13, 2019     AUTHORIZATION TO RELEASE PROTECTED HEALTH INFORMATION    Patient Name:  Kiran Espino  YOB: 1960    Emelina MRN:[id number]             This will authorize [facility]  to request information from :     [facility] F [phone], P [phone]    The following information is to be released for health maintenance and continuing care purposes with my primary care clinic:                 Pap Smear Report(most recent only)       When: ~11/1/2017       -I understand that I may revoke this authorization by written request at any time to the address listed at the top of this form.  I understand that the revocation will not apply to information that has already been released in response to this authorization.    -This authorization last for one year after the date you sign it.     -[facility] cannot prevent redisclosure of the information by the person or organization who receives your records under this authorization, and that information may not be covered by state and federal privacy protections after it is released. By signing this authorization, you release [facility] from any and all liability resulting from a redisclosure by the recipient.    ___________________________________          _____________  Signature of Patient/Authorized Person                     Date        ____________________________________________  (Reason if patient is unable to sign)

## 2019-06-13 NOTE — PATIENT INSTRUCTIONS
Patient Education     Understanding De Quervain Tenosynovitis    De Quervain tenosynovitis is a condition that can cause wrist and thumb pain. Tendons connect muscles in your wrist and forearm to the bones in your thumb. The tendons have a protective cover (sheath). The sheath s lining makes a fluid that lets the tendons slide easily when you straighten your wrist and thumb. If any of these tendons are irritated or injured, they can become swollen and inflamed. This is called de Quervain tenosynovitis.  How to say it  Prateek ten-oh-sin-oh-VY-tis   What causes de Quervain tenosynovitis?  This condition is most often caused from overuse. For example, making the same wrist motions over and over can irritate the tendons. This includes doing things like unscrewing jar lids or grasping a tool. Activities such as typing, playing racquet sports, knitting, and texting can also lead to the condition.  Symptoms of de Quervain tenosynovitis  You may have pain, soreness, redness, and swelling along the side of your wrist and the base of your thumb. You may feel pain when you pinch or grasp things, turn or touch your wrist, or make a fist. Your thumb may catch or make a crackling sound when you move it.  Treatment for de Quervain tenosynovitis  Treatments may include:    Resting the wrist and thumb. This involves limiting movements that make your symptoms worse. You also may need to avoid certain hobbies, sports, and types of work for a time.    Cold packs. These help reduce pain and swelling.    Prescription or over-the-counter pain medicines. These help relieve pain and swelling.    Splint or brace. This helps keep the thumb and wrist from moving and gives time for your tendons to heal.    Exercises or physical therapy. These help stretch, strengthen, and improve the range of motion in your wrist and thumb.    Shots of medicine into the area around the tendon. These may help relieve symptoms for a time.    Surgery. You  may need surgery if other treatments don t relieve symptoms. During surgery, the surgeon releases the sheath that surrounds the tendons so the tendons can move more easily.  Possible complications of de Quervain tenosynovitis  Without proper care and treatment, healing may take longer than normal. Also, symptoms may continue or get worse. Over time, the problem may become long-term (chronic). This can make it hard to use your wrist and thumb for normal activities.  When to call your healthcare provider  Call your healthcare provider right away if you have any of these:    Fever of 100.4 F (38 C) or higher, or as directed    Symptoms that don t get better with treatment, or get worse    Pain, numbness, or coldness in the hand    New symptoms   Date Last Reviewed: 3/10/2016    9058-8111 [employer]. [address] [place] [address]. All rights reserved. This information is not intended as a substitute for professional medical care. Always follow your healthcare professional's instructions.           Patient Education     Understanding De Quervain Tenosynovitis    De Quervain tenosynovitis is a condition that can cause wrist and thumb pain. Tendons connect muscles in your wrist and forearm to the bones in your thumb. The tendons have a protective cover (sheath). The sheath s lining makes a fluid that lets the tendons slide easily when you straighten your wrist and thumb. If any of these tendons are irritated or injured, they can become swollen and inflamed. This is called de Quervain tenosynovitis.  How to say it  [id number]   What causes de Quervain tenosynovitis?  This condition is most often caused from overuse. For example, making the same wrist motions over and over can irritate the tendons. This includes doing things like unscrewing jar lids or grasping a tool. Activities such as typing, playing racquet sports, knitting, and texting can also lead to the  condition.  Symptoms of de Quervain tenosynovitis  You may have pain, soreness, redness, and swelling along the side of your wrist and the base of your thumb. You may feel pain when you pinch or grasp things, turn or touch your wrist, or make a fist. Your thumb may catch or make a crackling sound when you move it.  Treatment for de Quervain tenosynovitis  Treatments may include:    Resting the wrist and thumb. This involves limiting movements that make your symptoms worse. You also may need to avoid certain hobbies, sports, and types of work for a time.    Cold packs. These help reduce pain and swelling.    Prescription or over-the-counter pain medicines. These help relieve pain and swelling.    Splint or brace. This helps keep the thumb and wrist from moving and gives time for your tendons to heal.    Exercises or physical therapy. These help stretch, strengthen, and improve the range of motion in your wrist and thumb.    Shots of medicine into the area around the tendon. These may help relieve symptoms for a time.    Surgery. You may need surgery if other treatments don t relieve symptoms. During surgery, the surgeon releases the sheath that surrounds the tendons so the tendons can move more easily.  Possible complications of de Quervain tenosynovitis  Without proper care and treatment, healing may take longer than normal. Also, symptoms may continue or get worse. Over time, the problem may become long-term (chronic). This can make it hard to use your wrist and thumb for normal activities.  When to call your healthcare provider  Call your healthcare provider right away if you have any of these:    Fever of 100.4 F (38 C) or higher, or as directed    Symptoms that don t get better with treatment, or get worse    Pain, numbness, or coldness in the hand    New symptoms   Date Last Reviewed: 3/10/2016    5876-8783 [employer]. [address] [facility], [place] [address]. All rights reserved. This  information is not intended as a substitute for professional medical care. Always follow your healthcare professional's instructions.

## 2019-06-25 ENCOUNTER — OFFICE VISIT (OUTPATIENT)
Dept: FAMILY MEDICINE | Facility: CLINIC | Age: 59
End: 2019-06-25
Payer: COMMERCIAL

## 2019-06-25 VITALS
BODY MASS INDEX: 22.02 KG/M2 | WEIGHT: 129 LBS | SYSTOLIC BLOOD PRESSURE: 102 MMHG | HEIGHT: 64 IN | OXYGEN SATURATION: 98 % | TEMPERATURE: 97.8 F | DIASTOLIC BLOOD PRESSURE: 58 MMHG | HEART RATE: 89 BPM

## 2019-06-25 DIAGNOSIS — M18.11 PRIMARY OSTEOARTHRITIS OF FIRST CARPOMETACARPAL JOINT OF RIGHT HAND: Primary | ICD-10-CM

## 2019-06-25 PROCEDURE — 20600 DRAIN/INJ JOINT/BURSA W/O US: CPT | Mod: RT | Performed by: PHYSICIAN ASSISTANT

## 2019-06-25 ASSESSMENT — MIFFLIN-ST. JEOR: SCORE: 1156.17

## 2019-06-25 NOTE — Clinical Note
Please abstract pap+HPV that was normal, due for repeat in 5 years. Report on file from [facility] scanned in on 6/24/2019 but wasn't abstracted to [facility], please update.

## 2019-06-25 NOTE — PROGRESS NOTES
"  SUBJECTIVE:   Kiran Espino is a 58 year old female who presents to clinic today for the following health issues:    Right Wrist Pain  -Patient was seen on 6/13 with Dr. Ford and was suggested to get an injection in her right wrist. She is right hand dominant. Doesn't want to keep taking Aleve for it. States she woke up one morning with the pain which she attributed to \"sleeping on it funny\" but is now worsening. Has never had injections to her wrist in the past.       Problem list and histories reviewed & adjusted, as indicated.  Additional history: as documented    Patient Active Problem List   Diagnosis     Melanoma (H) - 2     Advance care planning     DVT (deep venous thrombosis) (H)     Past Surgical History:   Procedure Laterality Date     ENDOSCOPIC DISSECTION FEMORAL LYMPH NODE  12/20/2012    Procedure: ENDOSCOPIC DISSECTION FEMORAL LYMPH NODE;  Left Endoscopic Femoral Lymph Node Dissection converted to open @ 1404;  Surgeon: Sergio Raines MD;  Location: [facility] OR     EXCISE MASS GROIN  11/5/2012    Procedure: EXCISE MASS GROIN;  Left Groin exploration, Excision lymph node biopsy;  Surgeon: Hedy Santillan MD;  Location: [facility] OR     HEAD & NECK SURGERY  2000    cystectomy on right        Social History     Tobacco Use     Smoking status: Never Smoker     Smokeless tobacco: Never Used   Substance Use Topics     Alcohol use: Yes     Alcohol/week: 3.5 oz     Types: 7 Glasses of wine per week     Comment: 7/wk     Family History   Problem Relation Age of Onset     Arthritis Mother      Hypertension Father      Coronary Artery Disease Paternal Grandfather      Diabetes Maternal Uncle      Hyperlipidemia No family hx of      Cerebrovascular Disease No family hx of      Breast Cancer No family hx of      Colon Cancer No family hx of          Current Outpatient Medications   Medication Sig Dispense Refill     Ascorbic Acid (VITAMIN C) 500 MG CHEW Take 1,000 mg by mouth daily.       No Known " "Allergies    Reviewed and updated as needed this visit by clinical staff  Tobacco  Allergies  Meds  Med Hx  Surg Hx  Fam Hx  Soc Hx      Reviewed and updated as needed this visit by Provider         ROS:  Constitutional, HEENT, cardiovascular, pulmonary, GI, , musculoskeletal, neuro, skin, endocrine and psych systems are negative, except as otherwise noted.    This document serves as a record of the services and decisions personally performed and made by ANCELMO Martines. It was created on her behalf by Sharona Lopez, a trained medical scribe. The creation of this document is based on the provider's statements to the medical scribe.  Sharona Lopez June 25, 2019 1:33 PM      OBJECTIVE:   /58 (BP Location: Right arm, Cuff Size: Adult Regular)   Pulse 89   Temp 97.8  F (36.6  C) (Oral)   Ht 1.619 m (5' 3.75\")   Wt 58.5 kg (129 lb)   SpO2 98%   BMI 22.32 kg/m   Body mass index is 22.32 kg/m .    GENERAL: healthy, alert and no distress  MS: tenderness along basilar joint. No gross musculoskeletal defects noted, no edema  SKIN: no suspicious lesions or rashes  NEURO: Normal strength and tone, mentation intact and speech normal  PSYCH: mentation appears normal, affect normal/bright    Diagnostic Test Results:  No results found for this or any previous visit (from the past 24 hour(s)).    PROCEDURE:  After discussing the risks, benefits and alternatives to a right basilar joint cortisone injection the patient elected to proceed with the injection.  The radial aspect of the right wrist was prepped with a betadine solution.  Using a sterile technique and a 27 gauge needle, 0.75 cc's of 1% lidocaine and 10 mg in Kenalog were injected into the right basilar joint.  The patient tolerated the procedure well and there were no immediate adverse effects.        ASSESSMENT/PLAN:   Kiran was seen today for musculoskeletal problem.    Diagnoses and all orders for this visit:    Primary osteoarthritis of first " carpometacarpal joint of right hand  Kenalog injection administered in clinic today by myself, pt tolerated procedure well. If no improvement will refer to hand specialist at [facility].  -     triamcinolone acetonide (KENALOG-10) injection 10 mg  -     Small Joint/Bursa injection and/or drainage (Finger)       Return in about 2 weeks (around 7/9/2019) for call to clinic for ortho referral to [facility] if not improving.    The information in this document, created by the medical scribe for me, accurately reflects the services I personally performed and the decisions made by me. I have reviewed and approved this document for accuracy prior to leaving the patient care area.  June 25, 2019 1:33 PM      Tameka Zimmer PA-C  [facility] [place]

## 2019-09-26 ENCOUNTER — OFFICE VISIT (OUTPATIENT)
Dept: FAMILY MEDICINE | Facility: CLINIC | Age: 59
End: 2019-09-26
Payer: COMMERCIAL

## 2019-09-26 VITALS
OXYGEN SATURATION: 97 % | HEART RATE: 103 BPM | SYSTOLIC BLOOD PRESSURE: 120 MMHG | HEIGHT: 64 IN | DIASTOLIC BLOOD PRESSURE: 80 MMHG | WEIGHT: 130 LBS | BODY MASS INDEX: 22.2 KG/M2 | TEMPERATURE: 99.3 F

## 2019-09-26 DIAGNOSIS — T75.3XXA MOTION SICKNESS, INITIAL ENCOUNTER: ICD-10-CM

## 2019-09-26 DIAGNOSIS — J20.9 ACUTE BRONCHITIS, UNSPECIFIED ORGANISM: Primary | ICD-10-CM

## 2019-09-26 PROCEDURE — 99213 OFFICE O/P EST LOW 20 MIN: CPT | Performed by: FAMILY MEDICINE

## 2019-09-26 RX ORDER — AZITHROMYCIN 250 MG/1
TABLET, FILM COATED ORAL
Qty: 6 TABLET | Refills: 0 | Status: SHIPPED | OUTPATIENT
Start: 2019-09-26 | End: 2020-01-23

## 2019-09-26 RX ORDER — SCOLOPAMINE TRANSDERMAL SYSTEM 1 MG/1
1 PATCH, EXTENDED RELEASE TRANSDERMAL
Qty: 2 PATCH | Refills: 0 | Status: SHIPPED | OUTPATIENT
Start: 2019-09-26

## 2019-09-26 ASSESSMENT — MIFFLIN-ST. JEOR: SCORE: 1155.71

## 2019-09-26 NOTE — PROGRESS NOTES
"Subjective   Kiran Espino is a 59 year old female who presents to clinic today for the following health issues:    HPI   Acute Illness   Acute illness concerns: URI  Onset: 3 weeks    Fever: no    Chills/Sweats: YES    Headache (location?): no    Sinus Pressure:YES    Conjunctivitis:  no    Ear Pain: YES- pressure    Rhinorrhea: YES    Congestion: YES    Sore Throat: no     Cough: YES-productive of green sputum    Wheeze: YES- yesterday    Decreased Appetite: YES    Nausea: no    Vomiting: no    Diarrhea:  no    Dysuria/Freq.: no    Fatigue/Achiness: YES    Sick/Strep Exposure: was just on a plane     Therapies Tried and outcome: nothing    She was in [place] and would wake up with a sore throat. She lost her voice for two days and it has been three weeks since this started. Yesterday felt heavier trying to breath and she was in [place].     Motion Sickness  She inquires about a motion sickness patch for a boat trip coming up soon.     Patient denies Zoster Vaccine, Pnuemothorax, and flu shot today.       Reviewed and updated as needed this visit by provider:  Tobacco  Allergies  Meds  Problems  Med Hx  Surg Hx  Fam Hx       Review of Systems   Constitutional, HEENT, cardiovascular, pulmonary, GI, , musculoskeletal, neuro, skin, endocrine and psych systems are negative, except as otherwise noted.      Objective   /80   Pulse 103   Temp 99.3  F (37.4  C) (Oral)   Ht 1.619 m (5' 3.75\")   Wt 59 kg (130 lb)   SpO2 97%   BMI 22.49 kg/m   Body mass index is 22.49 kg/m .  Physical Exam   GENERAL: healthy, alert, well nourished, well hydrated, no distress  EYES: Eyes grossly normal to inspection, extraocular movements - intact, and PERRL  HENT: ear canals- normal; TMs- normal; Nose- normal; Mouth- no ulcers, no lesions  NECK: no tenderness, no adenopathy, no asymmetry, no masses, no stiffness; thyroid- normal to palpation  RESP: minimal slight Wheezing otherwise  lungs clear to auscultation - no " rales, no rhonchi  CV: regular rates and rhythm, normal S1 S2, no S3 or S4 and no murmur, no click or rub -  MS: extremities- no gross deformities noted, no edema  SKIN: no suspicious lesions, no rashes  PSYCH: Alert and oriented times 3; speech- coherent , normal rate and volume; able to articulate logical thoughts, able to abstract reason, no tangential thoughts, no hallucinations or delusions, affect- normal  LYMPHATICS: ant. cervical- normal, post. cervical- normal, axillary- normal, supraclavicular- normal    Diagnostic Test Results      Assessment & Plan   Kiran was seen today for uri.    Diagnoses and all orders for this visit:    Acute bronchitis, unspecified organism - new diagnosis     -     azithromycin (ZITHROMAX) 250 MG tablet; Two tablets first day, then one tablet daily for four days    Motion sickness prevention -  -     scopolamine (TRANSDERM) 1 MG/3DAYS 72 hr patch; Place 1 patch onto the skin every 72 hours       See Patient Instructions    Return in about 1 week (around 10/3/2019), or if symptoms worsen or fail to improve, for recheck.          Frantz Ford MD   Pager - [phone]  [facility]

## 2019-10-18 ENCOUNTER — ANCILLARY PROCEDURE (OUTPATIENT)
Dept: GENERAL RADIOLOGY | Facility: CLINIC | Age: 59
End: 2019-10-18
Attending: ORTHOPAEDIC SURGERY
Payer: COMMERCIAL

## 2019-10-18 ENCOUNTER — OFFICE VISIT (OUTPATIENT)
Dept: ORTHOPEDICS | Facility: CLINIC | Age: 59
End: 2019-10-18
Payer: COMMERCIAL

## 2019-10-18 VITALS
DIASTOLIC BLOOD PRESSURE: 64 MMHG | BODY MASS INDEX: 22.2 KG/M2 | SYSTOLIC BLOOD PRESSURE: 114 MMHG | WEIGHT: 130 LBS | HEIGHT: 64 IN

## 2019-10-18 DIAGNOSIS — M25.531 RIGHT WRIST PAIN: ICD-10-CM

## 2019-10-18 DIAGNOSIS — M19.031 PRIMARY OSTEOARTHRITIS OF RIGHT WRIST: ICD-10-CM

## 2019-10-18 DIAGNOSIS — M65.4 RADIAL STYLOID TENOSYNOVITIS OF RIGHT HAND: ICD-10-CM

## 2019-10-18 DIAGNOSIS — M25.531 RIGHT WRIST PAIN: Primary | ICD-10-CM

## 2019-10-18 PROCEDURE — 20550 NJX 1 TENDON SHEATH/LIGAMENT: CPT | Mod: RT | Performed by: ORTHOPAEDIC SURGERY

## 2019-10-18 PROCEDURE — 99203 OFFICE O/P NEW LOW 30 MIN: CPT | Mod: 25 | Performed by: ORTHOPAEDIC SURGERY

## 2019-10-18 PROCEDURE — 73110 X-RAY EXAM OF WRIST: CPT | Mod: RT | Performed by: ORTHOPAEDIC SURGERY

## 2019-10-18 RX ORDER — LIDOCAINE HYDROCHLORIDE 10 MG/ML
1 INJECTION, SOLUTION INFILTRATION; PERINEURAL
Status: DISCONTINUED | OUTPATIENT
Start: 2019-10-18 | End: 2020-01-23

## 2019-10-18 RX ORDER — TESTOSTERONE CYPIONATE 200 MG/ML
1 INJECTION INTRAMUSCULAR
Status: DISCONTINUED | OUTPATIENT
Start: 2019-10-18 | End: 2020-01-23

## 2019-10-18 RX ADMIN — TESTOSTERONE CYPIONATE 1 ML: 200 INJECTION INTRAMUSCULAR at 10:54

## 2019-10-18 RX ADMIN — LIDOCAINE HYDROCHLORIDE 1 ML: 10 INJECTION, SOLUTION INFILTRATION; PERINEURAL at 10:54

## 2019-10-18 ASSESSMENT — MIFFLIN-ST. JEOR: SCORE: 1155.71

## 2019-10-18 NOTE — PATIENT INSTRUCTIONS
We recommend icing 3-4 times a day  Using the thumb splint for 2 weeks consistently  Follow-up as needed otherwise.

## 2019-10-18 NOTE — LETTER
10/18/2019         RE: Kiran Espino  [address]  [facility] [address]        Dear Colleague,    Thank you for referring your patient, Kiran Espino, to the [facility] ORTH[facility] SUR[facility]ERY. Please see a copy of my visit note below.    HISTORY OF PRESENT ILLNESS:    Kiran Espino is a 59 year old female who is seen in consultation at the request of Dr. Zimmer for right wrist pain, patient would like to discuss corticosteroid injection today.  Patient reports onset of wrist pain in 6/2019, she was seen by PCP where she had an injection that provided relief up until 1 week ago. Pain has gradually returned, and worsening. 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     Present symptoms: Radial sided wrist pain.  Pain is aching and sharp.  Pain is present with activities and progressively worsens with activity.  Difficulties with gripping, grasping, writing, brushing her teeth.  Denies swelling, redness, snapping, or clicking.   Current pain level: 1/10, Worst pain level: 8/10.   Treatments tried to this point: thumb spica splint, Aleve, Corticosteroid injection in the right wrist on 6/25/19 relief lasting approximately 4 months.    Orthopedic PMH: Arthritis    Past Medical History:   Diagnosis Date     Irregular heart beat     'benign' per pt     Malignant neoplasm (H)        Past Surgical History:   Procedure Laterality Date     ENDOSCOPIC DISSECTION FEMORAL LYMPH NODE  12/20/2012    Procedure: ENDOSCOPIC DISSECTION FEMORAL LYMPH NODE;  Left Endoscopic Femoral Lymph Node Dissection converted to open @ 1404;  Surgeon: Sergio Raines MD;  Location: [facility] OR     EXCISE MASS GROIN  11/5/2012    Procedure: EXCISE MASS GROIN;  Left Groin exploration, Excision lymph node biopsy;  Surgeon: Hedy Santillan MD;  Location:  OR     HEAD & NECK SURGERY  2000    cystectomy on right        Family  History   Problem Relation Age of Onset     Arthritis Mother      Hypertension Father      Coronary Artery Disease Paternal Grandfather      Diabetes Maternal Uncle      Hyperlipidemia No family hx of      Cerebrovascular Disease No family hx of      Breast Cancer No family hx of      Colon Cancer No family hx of        Social History     Socioeconomic History     Marital status:      Spouse name: Not on file     Number of children: Not on file     Years of education: Not on file     Highest education level: Not on file   Occupational History     Not on file   Social Needs     Financial resource strain: Not on file     Food insecurity:     Worry: Not on file     Inability: Not on file     Transportation needs:     Medical: Not on file     Non-medical: Not on file   Tobacco Use     Smoking status: Never Smoker     Smokeless tobacco: Never Used   Substance and Sexual Activity     Alcohol use: Yes     Alcohol/week: 5.8 standard drinks     Types: 7 Glasses of wine per week     Comment: 7/wk     Drug use: No     Sexual activity: Not on file   Lifestyle     Physical activity:     Days per week: Not on file     Minutes per session: Not on file     Stress: Not on file   Relationships     Social connections:     Talks on phone: Not on file     Gets together: Not on file     Attends Scientologist service: Not on file     Active member of club or organization: Not on file     Attends meetings of clubs or organizations: Not on file     Relationship status: Not on file     Intimate partner violence:     Fear of current or ex partner: Not on file     Emotionally abused: Not on file     Physically abused: Not on file     Forced sexual activity: Not on file   Other Topics Concern     Not on file   Social History Narrative     Not on file       Current Outpatient Medications   Medication Sig Dispense Refill     Ascorbic Acid (VITAMIN C) 500 MG CHEW Take 1,000 mg by mouth daily.       azithromycin (ZITHROMAX) 250 MG tablet Two  "tablets first day, then one tablet daily for four days (Patient not taking: Reported on 10/18/2019) 6 tablet 0     scopolamine (TRANSDERM) 1 MG/3DAYS 72 hr patch Place 1 patch onto the skin every 72 hours (Patient not taking: Reported on 10/18/2019) 2 patch 0       No Known Allergies    REVIEW OF SYSTEMS:  CONSTITUTIONAL:  NEGATIVE for fever, chills, change in weight  INTEGUMENTARY/SKIN:  NEGATIVE for worrisome rashes, moles or lesions  EYES:  NEGATIVE for vision changes or irritation  ENT/MOUTH:  NEGATIVE for ear, mouth and throat problems  RESP:  NEGATIVE for significant cough or SOB  BREAST:  NEGATIVE for masses, tenderness or discharge  CV:  NEGATIVE for chest pain, palpitations or peripheral edema  GI:  NEGATIVE for nausea, abdominal pain, heartburn, or change in bowel habits  :  Negative   MUSCULOSKELETAL:  See HPI above  NEURO:  NEGATIVE for weakness, dizziness or paresthesias  ENDOCRINE:  NEGATIVE for temperature intolerance, skin/hair changes  HEME/ALLERGY/IMMUNE:  NEGATIVE for bleeding problems  PSYCHIATRIC:  NEGATIVE for changes in mood or affect      PHYSICAL EXAM:  /64 (BP Location: Right arm, Patient Position: Chair, Cuff Size: Adult Regular)   Ht 1.619 m (5' 3.75\")   Wt 59 kg (130 lb)   BMI 22.49 kg/m     Body mass index is 22.49 kg/m .   GENERAL APPEARANCE: healthy, alert and no distress   HEENT: No apparent thyroid megaly. Clear sclera with normal ocular movement  RESPIRATORY: No labored breathing  SKIN: no suspicious lesions or rashes  NEURO: Normal strength and tone, mentation intact and speech normal  VASCULAR: Good pulses, and capillary refill   LYMPH: no lymphadenopathy   PSYCH:  mentation appears normal and affect normal/bright    MUSCULOSKELETAL:  Not in acute distress  Normal gait  Minimal prominence at the DIP joints of the fingers  More prominent at the thumb CMC joints, bilateral  Wrist range of motion is full, bilateral  No erythema in the wrist and hands, bilateral  Positive " Finkelstein test   tenderness along the first dorsal compartment the right wrist  Tenderness at the thumb CMC joint as well as scaphotrapezial joint, right  The pain at the thumb CMC joint region is not as tender as the first dorsal compartment  No tenderness at the A1 pulley of the thumb  No triggering of all digits  Sensation is intact  Circulation is intact           ASSESSMENT:  De Quervain's tenosynovitis, right  Mild thumb CMC joint DJD  Rather advanced scaphoid trapezoid joint DJD    PLAN:  X-rays of the right wrist taken today were visualized.  Findings are thoroughly explained.  Despite some tenderness in the radiographic changes indicating degenerative changes mostly at the scaphoid-Trapezoid joint, the main difficulty that she is dealing with is felt to be coming from the de Quervain's tenosynovitis.  She responded quite well from the previous cortisone injection.  With understanding of the treatment options including immobilization which she is already doing, icing along with over-the-counter medications versus cortisone injection versus surgical intervention of the first dorsal compartment release, she decided to go ahead with another cortisone injection.    She tolerated the injection well.  We recommend icing 3-4 times a day  Using the thumb splint for 2 weeks consistently  Follow-up as needed otherwise.          Imaging Interpretation:   3 views of the right wrist demonstrate presence of thumb carpometacarpal joint arthritis of mild to moderate degree with a slight subluxation of the metacarpal of the thumb, advanced scaphoid trapezoid degenerative changes as well as mild to moderate scaphotrapezial joint DJD.  Otherwise, no acute fractures or other osseous pathology are noted.        Hand / Upper Extremity Injection/Arthrocentesis: R extensor compartment 1  Date/Time: 10/18/2019 10:54 AM  Performed by: Vidal Chau MD  Authorized by: Vidal Chau MD     Indications:  Pain  Needle Size:  27  G  Guidance: landmark    Approach:  Radial  Condition: de Quervain's      Site:  R extensor compartment 1  Medications:  1 mL lidocaine 1 %; 1 mL dexamethasone 120 MG/30ML  Outcome:  Tolerated well, no immediate complications  Procedure discussed: discussed risks, benefits, and alternatives    Consent Given by:  Patient  Timeout: timeout called immediately prior to procedure    Prep: patient was prepped and draped in usual sterile fashion            Vidal Chau MD  Department of Orthopedic Surgery        Disclaimer: This note consists of symbols derived from keyboarding, dictation and/or voice recognition software. As a result, there may be errors in the script that have gone undetected. Please consider this when interpreting information found in this chart.      Again, thank you for allowing me to participate in the care of your patient.        Sincerely,        Vidal Chau MD

## 2019-10-18 NOTE — PROGRESS NOTES
HISTORY OF PRESENT ILLNESS:    Kiran Espino is a 59 year old female who is seen in consultation at the request of Dr. Zimmer for right wrist pain, patient would like to discuss corticosteroid injection today.  Patient reports onset of wrist pain in 6/2019, she was seen by PCP where she had an injection that provided relief up until 1 week ago. Pain has gradually returned, and worsening. 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     Present symptoms: Radial sided wrist pain.  Pain is aching and sharp.  Pain is present with activities and progressively worsens with activity.  Difficulties with gripping, grasping, writing, brushing her teeth.  Denies swelling, redness, snapping, or clicking.   Current pain level: 1/10, Worst pain level: 8/10.   Treatments tried to this point: thumb spica splint, Aleve, Corticosteroid injection in the right wrist on 6/25/19 relief lasting approximately 4 months.    Orthopedic PMH: Arthritis    Past Medical History:   Diagnosis Date     Irregular heart beat     'benign' per pt     Malignant neoplasm (H)        Past Surgical History:   Procedure Laterality Date     ENDOSCOPIC DISSECTION FEMORAL LYMPH NODE  12/20/2012    Procedure: ENDOSCOPIC DISSECTION FEMORAL LYMPH NODE;  Left Endoscopic Femoral Lymph Node Dissection converted to open @ 1404;  Surgeon: Sergio Raines MD;  Location: [facility] OR     EXCISE MASS GROIN  11/5/2012    Procedure: EXCISE MASS GROIN;  Left Groin exploration, Excision lymph node biopsy;  Surgeon: Hedy Santillan MD;  Location:  OR     HEAD & NECK SURGERY  2000    cystectomy on right        Family History   Problem Relation Age of Onset     Arthritis Mother      Hypertension Father      Coronary Artery Disease Paternal Grandfather      Diabetes Maternal Uncle      Hyperlipidemia No family hx of      Cerebrovascular Disease No family hx of       Breast Cancer No family hx of      Colon Cancer No family hx of        Social History     Socioeconomic History     Marital status:      Spouse name: Not on file     Number of children: Not on file     Years of education: Not on file     Highest education level: Not on file   Occupational History     Not on file   Social Needs     Financial resource strain: Not on file     Food insecurity:     Worry: Not on file     Inability: Not on file     Transportation needs:     Medical: Not on file     Non-medical: Not on file   Tobacco Use     Smoking status: Never Smoker     Smokeless tobacco: Never Used   Substance and Sexual Activity     Alcohol use: Yes     Alcohol/week: 5.8 standard drinks     Types: 7 Glasses of wine per week     Comment: 7/wk     Drug use: No     Sexual activity: Not on file   Lifestyle     Physical activity:     Days per week: Not on file     Minutes per session: Not on file     Stress: Not on file   Relationships     Social connections:     Talks on phone: Not on file     Gets together: Not on file     Attends Congregational service: Not on file     Active member of club or organization: Not on file     Attends meetings of clubs or organizations: Not on file     Relationship status: Not on file     Intimate partner violence:     Fear of current or ex partner: Not on file     Emotionally abused: Not on file     Physically abused: Not on file     Forced sexual activity: Not on file   Other Topics Concern     Not on file   Social History Narrative     Not on file       Current Outpatient Medications   Medication Sig Dispense Refill     Ascorbic Acid (VITAMIN C) 500 MG CHEW Take 1,000 mg by mouth daily.       azithromycin (ZITHROMAX) 250 MG tablet Two tablets first day, then one tablet daily for four days (Patient not taking: Reported on 10/18/2019) 6 tablet 0     scopolamine (TRANSDERM) 1 MG/3DAYS 72 hr patch Place 1 patch onto the skin every 72 hours (Patient not taking: Reported on 10/18/2019) 2  "patch 0       No Known Allergies    REVIEW OF SYSTEMS:  CONSTITUTIONAL:  NEGATIVE for fever, chills, change in weight  INTEGUMENTARY/SKIN:  NEGATIVE for worrisome rashes, moles or lesions  EYES:  NEGATIVE for vision changes or irritation  ENT/MOUTH:  NEGATIVE for ear, mouth and throat problems  RESP:  NEGATIVE for significant cough or SOB  BREAST:  NEGATIVE for masses, tenderness or discharge  CV:  NEGATIVE for chest pain, palpitations or peripheral edema  GI:  NEGATIVE for nausea, abdominal pain, heartburn, or change in bowel habits  :  Negative   MUSCULOSKELETAL:  See HPI above  NEURO:  NEGATIVE for weakness, dizziness or paresthesias  ENDOCRINE:  NEGATIVE for temperature intolerance, skin/hair changes  HEME/ALLERGY/IMMUNE:  NEGATIVE for bleeding problems  PSYCHIATRIC:  NEGATIVE for changes in mood or affect      PHYSICAL EXAM:  /64 (BP Location: Right arm, Patient Position: Chair, Cuff Size: Adult Regular)   Ht 1.619 m (5' 3.75\")   Wt 59 kg (130 lb)   BMI 22.49 kg/m    Body mass index is 22.49 kg/m .   GENERAL APPEARANCE: healthy, alert and no distress   HEENT: No apparent thyroid megaly. Clear sclera with normal ocular movement  RESPIRATORY: No labored breathing  SKIN: no suspicious lesions or rashes  NEURO: Normal strength and tone, mentation intact and speech normal  VASCULAR: Good pulses, and capillary refill   LYMPH: no lymphadenopathy   PSYCH:  mentation appears normal and affect normal/bright    MUSCULOSKELETAL:  Not in acute distress  Normal gait  Minimal prominence at the DIP joints of the fingers  More prominent at the thumb CMC joints, bilateral  Wrist range of motion is full, bilateral  No erythema in the wrist and hands, bilateral  Positive Finkelstein test   tenderness along the first dorsal compartment the right wrist  Tenderness at the thumb CMC joint as well as scaphotrapezial joint, right  The pain at the thumb CMC joint region is not as tender as the first dorsal compartment  No " tenderness at the A1 pulley of the thumb  No triggering of all digits  Sensation is intact  Circulation is intact           ASSESSMENT:  De Quervain's tenosynovitis, right  Mild thumb CMC joint DJD  Rather advanced scaphoid trapezoid joint DJD    PLAN:  X-rays of the right wrist taken today were visualized.  Findings are thoroughly explained.  Despite some tenderness in the radiographic changes indicating degenerative changes mostly at the scaphoid-Trapezoid joint, the main difficulty that she is dealing with is felt to be coming from the de Quervain's tenosynovitis.  She responded quite well from the previous cortisone injection.  With understanding of the treatment options including immobilization which she is already doing, icing along with over-the-counter medications versus cortisone injection versus surgical intervention of the first dorsal compartment release, she decided to go ahead with another cortisone injection.    She tolerated the injection well.  We recommend icing 3-4 times a day  Using the thumb splint for 2 weeks consistently  Follow-up as needed otherwise.          Imaging Interpretation:   3 views of the right wrist demonstrate presence of thumb carpometacarpal joint arthritis of mild to moderate degree with a slight subluxation of the metacarpal of the thumb, advanced scaphoid trapezoid degenerative changes as well as mild to moderate scaphotrapezial joint DJD.  Otherwise, no acute fractures or other osseous pathology are noted.        Hand / Upper Extremity Injection/Arthrocentesis: R extensor compartment 1  Date/Time: 10/18/2019 10:54 AM  Performed by: Vidal Chau MD  Authorized by: Vidal Chau MD     Indications:  Pain  Needle Size:  27 G  Guidance: landmark    Approach:  Radial  Condition: de Quervain's      Site:  R extensor compartment 1  Medications:  1 mL lidocaine 1 %; 1 mL dexamethasone 120 MG/30ML  Outcome:  Tolerated well, no immediate complications  Procedure discussed:  discussed risks, benefits, and alternatives    Consent Given by:  Patient  Timeout: timeout called immediately prior to procedure    Prep: patient was prepped and draped in usual sterile fashion            Vidal Chau MD  Department of Orthopedic Surgery        Disclaimer: This note consists of symbols derived from keyboarding, dictation and/or voice recognition software. As a result, there may be errors in the script that have gone undetected. Please consider this when interpreting information found in this chart.

## 2019-11-04 ENCOUNTER — HEALTH MAINTENANCE LETTER (OUTPATIENT)
Age: 59
End: 2019-11-04

## 2020-01-20 NOTE — PROGRESS NOTES
SUBJECTIVE:   Kiran Espino is a 59 year old female who presents to clinic today for the following health issues:    Injection   Right wrist Injection  Previous done 6/2019 and 10/18/2019    Patient states that the June injection (done by me on 6/25) gave complete relief until about September but the October injection did not give any relief (done by Dr. Chau) . Patient hoping to get injection done again today.     XR was done 10/2019:  3 views of the right wrist demonstrate presence of thumb carpometacarpal joint arthritis of mild to moderate degree with a slight subluxation of the metacarpal of the thumb, advanced scaphoid trapezium degenerative changes as well as mild to moderate scaphotrapezial joint DJD.  Otherwise, no acute fractures or other osseous pathology are noted.        Problem list and histories reviewed & adjusted, as indicated.  Additional history: as documented    Patient Active Problem List   Diagnosis     Melanoma (H) - 2     Advance care planning     DVT (deep venous thrombosis) (H)     Past Surgical History:   Procedure Laterality Date     ENDOSCOPIC DISSECTION FEMORAL LYMPH NODE  12/20/2012    Procedure: ENDOSCOPIC DISSECTION FEMORAL LYMPH NODE;  Left Endoscopic Femoral Lymph Node Dissection converted to open @ 1404;  Surgeon: Sergio Raines MD;  Location: [facility] OR     EXCISE MASS GROIN  11/5/2012    Procedure: EXCISE MASS GROIN;  Left Groin exploration, Excision lymph node biopsy;  Surgeon: Hedy Santillan MD;  Location:  OR     HEAD & NECK SURGERY  2000    cystectomy on right        Social History     Tobacco Use     Smoking status: Never Smoker     Smokeless tobacco: Never Used   Substance Use Topics     Alcohol use: Yes     Alcohol/week: 5.8 standard drinks     Types: 7 Glasses of wine per week     Comment: 7/wk     Family History   Problem Relation Age of Onset     Arthritis Mother      Hypertension Father      Coronary Artery Disease Paternal Grandfather      Diabetes Maternal  "Uncle      Hyperlipidemia No family hx of      Cerebrovascular Disease No family hx of      Breast Cancer No family hx of      Colon Cancer No family hx of          Current Outpatient Medications   Medication Sig Dispense Refill     Ascorbic Acid (VITAMIN C) 500 MG CHEW Take 1,000 mg by mouth daily.       scopolamine (TRANSDERM) 1 MG/3DAYS 72 hr patch Place 1 patch onto the skin every 72 hours (Patient not taking: Reported on 10/18/2019) 2 patch 0     No Known Allergies    Reviewed and updated as needed this visit by clinical staff  Tobacco  Allergies  Meds  Problems  Med Hx  Surg Hx  Fam Hx  Soc Hx        Reviewed and updated as needed this visit by Provider  Tobacco  Allergies  Meds  Problems  Med Hx  Surg Hx  Fam Hx         ROS:  Constitutional, HEENT, cardiovascular, pulmonary, GI, , musculoskeletal, neuro, skin, endocrine and psych systems are negative, except as otherwise noted.      OBJECTIVE:   /66 (BP Location: Left arm, Cuff Size: Adult Regular)   Pulse 80   Temp 97.9  F (36.6  C) (Oral)   Ht 1.619 m (5' 3.75\")   Wt 58.1 kg (128 lb)   SpO2 99%   BMI 22.14 kg/m   Body mass index is 22.14 kg/m .      GENERAL: healthy, alert and no distress  MS: tenderness along right basilar joint. No gross musculoskeletal defects noted, no edema  SKIN: no suspicious lesions or rashes  NEURO: Normal strength and tone, mentation intact and speech normal  PSYCH: mentation appears normal, affect normal/bright    PROCEDURE:  After discussing the risks, benefits and alternatives to a right basilar joint cortisone injection the patient elected to proceed with the injection.  The radial aspect of the right wrist was prepped with a betadine solution.  Using a sterile technique and a 27 gauge needle, 0.75 cc's of 1% lidocaine and 10 mg in Kenalog were injected into the right basilar joint.  The patient tolerated the procedure well and there were no immediate adverse effects.      ASSESSMENT/PLAN:   Kiran" was seen today for musculoskeletal problem.    Diagnoses and all orders for this visit:    Primary osteoarthritis of first carpometacarpal joint of right hand  We will repeat injection per procedure note above.  Patient tolerated injection well.  If symptoms return recommend to follow-up with [facility] orthopedic hand specialist.  Name and referral given to the patient today.  -     triamcinolone acetonide (KENALOG-10) injection 10 mg  -     Small Joint/Bursa injection and/or drainage (Finger)  -     Orthopedic & Spine  Referral; Future        Return in about 1 month (around 2/23/2020) for Orthopedic hand specialist if needed.     [address] [address]  [email or web address]   Office: [phone]           Tameka Zimmer MS, PA-C

## 2020-01-23 ENCOUNTER — OFFICE VISIT (OUTPATIENT)
Dept: FAMILY MEDICINE | Facility: CLINIC | Age: 60
End: 2020-01-23
Payer: COMMERCIAL

## 2020-01-23 VITALS
HEART RATE: 80 BPM | DIASTOLIC BLOOD PRESSURE: 66 MMHG | WEIGHT: 128 LBS | BODY MASS INDEX: 21.85 KG/M2 | TEMPERATURE: 97.9 F | OXYGEN SATURATION: 99 % | SYSTOLIC BLOOD PRESSURE: 104 MMHG | HEIGHT: 64 IN

## 2020-01-23 DIAGNOSIS — M18.11 PRIMARY OSTEOARTHRITIS OF FIRST CARPOMETACARPAL JOINT OF RIGHT HAND: Primary | ICD-10-CM

## 2020-01-23 PROCEDURE — 20605 DRAIN/INJ JOINT/BURSA W/O US: CPT | Mod: RT | Performed by: PHYSICIAN ASSISTANT

## 2020-01-23 ASSESSMENT — MIFFLIN-ST. JEOR: SCORE: 1146.63

## 2020-02-24 ENCOUNTER — TRANSFERRED RECORDS (OUTPATIENT)
Dept: HEALTH INFORMATION MANAGEMENT | Facility: CLINIC | Age: 60
End: 2020-02-24

## 2020-11-22 ENCOUNTER — HEALTH MAINTENANCE LETTER (OUTPATIENT)
Age: 60
End: 2020-11-22

## 2021-01-15 ENCOUNTER — HEALTH MAINTENANCE LETTER (OUTPATIENT)
Age: 61
End: 2021-01-15

## 2021-02-11 ENCOUNTER — HOSPITAL ENCOUNTER (OUTPATIENT)
Dept: MAMMOGRAPHY | Facility: CLINIC | Age: 61
Discharge: HOME | End: 2021-02-11
Attending: OBSTETRICS & GYNECOLOGY | Admitting: OBSTETRICS & GYNECOLOGY
Payer: COMMERCIAL

## 2021-02-11 DIAGNOSIS — Z12.31 VISIT FOR SCREENING MAMMOGRAM: ICD-10-CM

## 2021-02-11 PROCEDURE — 77063 BREAST TOMOSYNTHESIS BI: CPT

## 2021-09-18 ENCOUNTER — HEALTH MAINTENANCE LETTER (OUTPATIENT)
Age: 61
End: 2021-09-18

## 2021-11-01 ENCOUNTER — TRANSFERRED RECORDS (OUTPATIENT)
Dept: HEALTH INFORMATION MANAGEMENT | Facility: CLINIC | Age: 61
End: 2021-11-01

## 2021-11-01 LAB — PAP SMEAR - HIM PATIENT REPORTED: NEGATIVE

## 2022-01-08 ENCOUNTER — HEALTH MAINTENANCE LETTER (OUTPATIENT)
Age: 62
End: 2022-01-08

## 2022-02-15 ENCOUNTER — ANCILLARY PROCEDURE (OUTPATIENT)
Dept: MAMMOGRAPHY | Facility: CLINIC | Age: 62
End: 2022-02-15
Payer: COMMERCIAL

## 2022-02-15 DIAGNOSIS — Z12.31 VISIT FOR SCREENING MAMMOGRAM: ICD-10-CM

## 2022-02-15 PROCEDURE — 77063 BREAST TOMOSYNTHESIS BI: CPT | Mod: TC | Performed by: RADIOLOGY

## 2022-02-15 PROCEDURE — 77067 SCR MAMMO BI INCL CAD: CPT | Mod: TC | Performed by: RADIOLOGY

## 2022-07-11 ENCOUNTER — TELEPHONE (OUTPATIENT)
Dept: NURSING | Facility: CLINIC | Age: 62
End: 2022-07-11

## 2022-07-11 NOTE — TELEPHONE ENCOUNTER
Patient is calling and is wanting to know if she is covered under insurance to go to the [facility].  FNA advised to telephone the number on the back of her insurance card.      COVID 19 Nurse Triage Plan/Patient Instructions    Please be aware that novel coronavirus (COVID-19) may be circulating in the community. If you develop symptoms such as fever, cough, or SOB or if you have concerns about the presence of another infection including coronavirus (COVID-19), please contact your health care provider or visit [email or web address].     Disposition/Instructions    Home care recommended. Follow home care protocol based instructions.    Thank you for taking steps to prevent the spread of this virus.  o Limit your contact with others.  o Wear a simple mask to cover your cough.  o Wash your hands well and often.    Resources    [facility]: About COVID-19: [email or web address]    CDC: What to Do If You're Sick: www.cdc.gov/coronavirus/2019-ncov/about/steps-when-sick.html    CDC: Ending Home Isolation: www.cdc.gov/coronavirus/2019-ncov/hcp/disposition-in-home-patients.html     CDC: Caring for Someone: www.cdc.gov/coronavirus/2019-ncov/if-you-are-sick/care-for-someone.html     [facility]: Interim Guidance for Hospital Discharge to Home: www.health.[email or web address].[email or web address]/diseases/coronavirus/hcp/hospdischarge.pdf    [facility] clinical trials (COVID-19 research studies): [email or web address].[facility]-clinical-trials     Below are the COVID-19 hotlines at the [facility] of Health ([facility]). Interpreters are available.   o For health questions: Call 335-830-8870 or 1-106.237.3999 (7 a.m. to 7 p.m.)  o For questions about schools and childcare: Call 267-617-9810 or 1-187.878.4860 (7 a.m. to 7 p.m.)

## 2022-07-12 ENCOUNTER — OFFICE VISIT (OUTPATIENT)
Dept: FAMILY MEDICINE | Facility: CLINIC | Age: 62
End: 2022-07-12
Payer: COMMERCIAL

## 2022-07-12 VITALS
RESPIRATION RATE: 18 BRPM | OXYGEN SATURATION: 99 % | SYSTOLIC BLOOD PRESSURE: 100 MMHG | DIASTOLIC BLOOD PRESSURE: 68 MMHG | BODY MASS INDEX: 22.33 KG/M2 | TEMPERATURE: 97.5 F | HEIGHT: 65 IN | WEIGHT: 134 LBS | HEART RATE: 84 BPM

## 2022-07-12 DIAGNOSIS — H69.92 DYSFUNCTION OF LEFT EUSTACHIAN TUBE: Primary | ICD-10-CM

## 2022-07-12 PROBLEM — Z13.6 CARDIOVASCULAR SCREENING; LDL GOAL LESS THAN 130: Status: ACTIVE | Noted: 2022-07-12

## 2022-07-12 PROCEDURE — 99213 OFFICE O/P EST LOW 20 MIN: CPT | Performed by: FAMILY MEDICINE

## 2022-07-12 NOTE — PROGRESS NOTES
Assessment & Plan       ICD-10-CM    1. Dysfunction of left eustachian tube  H69.82        Discussed treatment/modality options, including risk and benefits, she desires:    1) trial of popping ears    2) trial of sudafed    3) hold on antibiotics    All diagnosis above reviewed and noted above, otherwise stable.      See [facility] orders for further details.      Return in about 1 week (around 7/19/2022) for Follow Up Acute.    No LOS data to display    Doing chart review, history and exam, documentation and further activities as noted.           Mckinley Bobby MD, FAAFP     [facility]  [address] [place] [address]  [email or web address]   Office: [phone]  Fax: [phone]  Pager: [phone]       Pat Beck is a 61 year old, presenting for the following health issues:  Otalgia      History of Present Illness       Reason for visit:  Ear  Symptom onset:  1-3 days ago  Symptoms include:  Hearing loss  Symptom intensity:  Mild  Symptom progression:  Staying the same  Had these symptoms before:  No    She eats 2-3 servings of fruits and vegetables daily.She consumes 0 sweetened beverage(s) daily.She exercises with enough effort to increase her heart rate 10 to 19 minutes per day.  She exercises with enough effort to increase her heart rate 3 or less days per week.   She is taking medications regularly.     Left ear plugged last 4 days, decreased hearing after trying to flush on 7/8/2022 - no other sinus/uri symptoms - no f/c/s    Review of Systems   CONSTITUTIONAL: NEGATIVE for fever, chills, change in weight  INTEGUMENTARY/SKIN: NEGATIVE for worrisome rashes, moles or lesions  EYES: NEGATIVE for vision changes or irritation  ENT/MOUTH: NEGATIVE for ear, mouth and throat problems  RESP: NEGATIVE for significant cough or SOB  CV: NEGATIVE for chest pain, palpitations or peripheral edema  GI: NEGATIVE for nausea,  "abdominal pain, heartburn, or change in bowel habits  : NEGATIVE for frequency, dysuria, or hematuria  MUSCULOSKELETAL: NEGATIVE for significant arthralgias or myalgia  NEURO: NEGATIVE for weakness, dizziness or paresthesias  ENDOCRINE: NEGATIVE for temperature intolerance, skin/hair changes  HEME: NEGATIVE for bleeding problems  PSYCHIATRIC: NEGATIVE for changes in mood or affect      Objective    /68   Pulse 84   Temp 97.5  F (36.4  C) (Tympanic)   Resp 18   Ht 1.638 m (5' 4.5\")   Wt 60.8 kg (134 lb)   SpO2 99%   BMI 22.65 kg/m    Body mass index is 22.65 kg/m .  Physical Exam   GENERAL: healthy, alert and no distress  EYES: Eyes grossly normal to inspection, PERRL and conjunctivae and sclerae normal  HENT: ear canals and TM's normal, nose and mouth without ulcers or lesions  NECK: no adenopathy, no asymmetry, masses, or scars and thyroid normal to palpation  RESP: lungs clear to auscultation - no rales, rhonchi or wheezes  CV: regular rate and rhythm, normal S1 S2, no S3 or S4, no murmur, click or rub, no peripheral edema and peripheral pulses strong  ABDOMEN: soft, nontender, no hepatosplenomegaly, no masses and bowel sounds normal  MS: no gross musculoskeletal defects noted, no edema  SKIN: no suspicious lesions or rashes  NEURO: Normal strength and tone, mentation intact and speech normal  PSYCH: mentation appears normal, affect normal/bright    "

## 2022-11-20 ENCOUNTER — HEALTH MAINTENANCE LETTER (OUTPATIENT)
Age: 62
End: 2022-11-20

## 2023-02-16 ENCOUNTER — ANCILLARY PROCEDURE (OUTPATIENT)
Dept: MAMMOGRAPHY | Facility: CLINIC | Age: 63
End: 2023-02-16
Payer: COMMERCIAL

## 2023-02-16 DIAGNOSIS — Z12.31 VISIT FOR SCREENING MAMMOGRAM: ICD-10-CM

## 2023-02-16 PROCEDURE — 77067 SCR MAMMO BI INCL CAD: CPT | Mod: TC | Performed by: RADIOLOGY

## 2023-02-16 PROCEDURE — 77063 BREAST TOMOSYNTHESIS BI: CPT | Mod: TC | Performed by: RADIOLOGY

## 2023-05-30 ENCOUNTER — TRANSFERRED RECORDS (OUTPATIENT)
Dept: HEALTH INFORMATION MANAGEMENT | Facility: CLINIC | Age: 63
End: 2023-05-30
Payer: COMMERCIAL

## 2023-06-02 ENCOUNTER — HEALTH MAINTENANCE LETTER (OUTPATIENT)
Age: 63
End: 2023-06-02

## 2024-01-23 ENCOUNTER — PATIENT OUTREACH (OUTPATIENT)
Dept: CARE COORDINATION | Facility: CLINIC | Age: 64
End: 2024-01-23
Payer: COMMERCIAL

## 2024-02-06 ENCOUNTER — PATIENT OUTREACH (OUTPATIENT)
Dept: CARE COORDINATION | Facility: CLINIC | Age: 64
End: 2024-02-06
Payer: COMMERCIAL

## 2024-02-27 ENCOUNTER — ANCILLARY PROCEDURE (OUTPATIENT)
Dept: MAMMOGRAPHY | Facility: CLINIC | Age: 64
End: 2024-02-27
Payer: COMMERCIAL

## 2024-02-27 DIAGNOSIS — Z12.31 VISIT FOR SCREENING MAMMOGRAM: ICD-10-CM

## 2024-02-27 PROCEDURE — 77067 SCR MAMMO BI INCL CAD: CPT | Mod: TC | Performed by: RADIOLOGY

## 2024-02-27 PROCEDURE — 77063 BREAST TOMOSYNTHESIS BI: CPT | Mod: TC | Performed by: RADIOLOGY

## 2024-03-06 ENCOUNTER — HOSPITAL ENCOUNTER (OUTPATIENT)
Dept: ULTRASOUND IMAGING | Facility: CLINIC | Age: 64
Discharge: HOME | End: 2024-03-06
Attending: INTERNAL MEDICINE
Payer: COMMERCIAL

## 2024-03-06 ENCOUNTER — HOSPITAL ENCOUNTER (OUTPATIENT)
Dept: MAMMOGRAPHY | Facility: CLINIC | Age: 64
Discharge: HOME | End: 2024-03-06
Attending: INTERNAL MEDICINE
Payer: COMMERCIAL

## 2024-03-06 DIAGNOSIS — R92.8 ABNORMAL MAMMOGRAM: ICD-10-CM

## 2024-03-06 PROCEDURE — 76642 ULTRASOUND BREAST LIMITED: CPT | Mod: LT

## 2024-03-06 PROCEDURE — 77065 DX MAMMO INCL CAD UNI: CPT | Mod: LT

## 2024-03-13 ENCOUNTER — HOSPITAL ENCOUNTER (EMERGENCY)
Facility: CLINIC | Age: 64
Discharge: HOME | End: 2024-03-13
Attending: PHYSICIAN ASSISTANT | Admitting: PHYSICIAN ASSISTANT
Payer: COMMERCIAL

## 2024-03-13 ENCOUNTER — TELEPHONE (OUTPATIENT)
Dept: UROLOGY | Facility: CLINIC | Age: 64
End: 2024-03-13

## 2024-03-13 ENCOUNTER — APPOINTMENT (OUTPATIENT)
Dept: CT IMAGING | Facility: CLINIC | Age: 64
End: 2024-03-13
Attending: PHYSICIAN ASSISTANT
Payer: COMMERCIAL

## 2024-03-13 ENCOUNTER — NURSE TRIAGE (OUTPATIENT)
Dept: FAMILY MEDICINE | Facility: CLINIC | Age: 64
End: 2024-03-13
Payer: COMMERCIAL

## 2024-03-13 VITALS
DIASTOLIC BLOOD PRESSURE: 63 MMHG | BODY MASS INDEX: 23.92 KG/M2 | HEIGHT: 63 IN | WEIGHT: 135 LBS | OXYGEN SATURATION: 100 % | HEART RATE: 80 BPM | SYSTOLIC BLOOD PRESSURE: 129 MMHG | TEMPERATURE: 97.5 F | RESPIRATION RATE: 20 BRPM

## 2024-03-13 DIAGNOSIS — N20.0 KIDNEY STONE: ICD-10-CM

## 2024-03-13 LAB
ALBUMIN UR-MCNC: 50 MG/DL
ANION GAP SERPL CALCULATED.3IONS-SCNC: 14 MMOL/L (ref 7–15)
APPEARANCE UR: ABNORMAL
BACTERIA #/AREA URNS HPF: ABNORMAL /HPF
BASOPHILS # BLD AUTO: 0 10E3/UL (ref 0–0.2)
BASOPHILS NFR BLD AUTO: 0 %
BILIRUB UR QL STRIP: NEGATIVE
BUN SERPL-MCNC: 20.1 MG/DL (ref 8–23)
CALCIUM SERPL-MCNC: 10.9 MG/DL (ref 8.8–10.2)
CHLORIDE SERPL-SCNC: 105 MMOL/L (ref 98–107)
COLOR UR AUTO: YELLOW
CREAT SERPL-MCNC: 0.92 MG/DL (ref 0.51–0.95)
DEPRECATED HCO3 PLAS-SCNC: 20 MMOL/L (ref 22–29)
EGFRCR SERPLBLD CKD-EPI 2021: 70 ML/MIN/1.73M2
EOSINOPHIL # BLD AUTO: 0 10E3/UL (ref 0–0.7)
EOSINOPHIL NFR BLD AUTO: 0 %
ERYTHROCYTE [DISTWIDTH] IN BLOOD BY AUTOMATED COUNT: 12.5 % (ref 10–15)
GLUCOSE SERPL-MCNC: 137 MG/DL (ref 70–99)
GLUCOSE UR STRIP-MCNC: NEGATIVE MG/DL
HCT VFR BLD AUTO: 41 % (ref 35–47)
HGB BLD-MCNC: 14.1 G/DL (ref 11.7–15.7)
HGB UR QL STRIP: ABNORMAL
HOLD SPECIMEN: NORMAL
HOLD SPECIMEN: NORMAL
HYALINE CASTS: 2 /LPF
IMM GRANULOCYTES # BLD: 0 10E3/UL
IMM GRANULOCYTES NFR BLD: 0 %
KETONES UR STRIP-MCNC: 40 MG/DL
LEUKOCYTE ESTERASE UR QL STRIP: ABNORMAL
LYMPHOCYTES # BLD AUTO: 0.5 10E3/UL (ref 0.8–5.3)
LYMPHOCYTES NFR BLD AUTO: 4 %
MCH RBC QN AUTO: 30.1 PG (ref 26.5–33)
MCHC RBC AUTO-ENTMCNC: 34.4 G/DL (ref 31.5–36.5)
MCV RBC AUTO: 88 FL (ref 78–100)
MONOCYTES # BLD AUTO: 0.5 10E3/UL (ref 0–1.3)
MONOCYTES NFR BLD AUTO: 4 %
MUCOUS THREADS #/AREA URNS LPF: PRESENT /LPF
NEUTROPHILS # BLD AUTO: 11.1 10E3/UL (ref 1.6–8.3)
NEUTROPHILS NFR BLD AUTO: 92 %
NITRATE UR QL: NEGATIVE
NRBC # BLD AUTO: 0 10E3/UL
NRBC BLD AUTO-RTO: 0 /100
PH UR STRIP: 8 [PH] (ref 5–7)
PLATELET # BLD AUTO: 261 10E3/UL (ref 150–450)
POTASSIUM SERPL-SCNC: 4.3 MMOL/L (ref 3.4–5.3)
RBC # BLD AUTO: 4.68 10E6/UL (ref 3.8–5.2)
RBC URINE: >182 /HPF
SODIUM SERPL-SCNC: 139 MMOL/L (ref 135–145)
SP GR UR STRIP: 1.02 (ref 1–1.03)
SQUAMOUS EPITHELIAL: <1 /HPF
UROBILINOGEN UR STRIP-MCNC: NORMAL MG/DL
WBC # BLD AUTO: 12.2 10E3/UL (ref 4–11)
WBC URINE: 21 /HPF

## 2024-03-13 PROCEDURE — 36415 COLL VENOUS BLD VENIPUNCTURE: CPT | Performed by: PHYSICIAN ASSISTANT

## 2024-03-13 PROCEDURE — 81001 URINALYSIS AUTO W/SCOPE: CPT | Performed by: PHYSICIAN ASSISTANT

## 2024-03-13 PROCEDURE — 250N000013 HC RX MED GY IP 250 OP 250 PS 637: Performed by: PHYSICIAN ASSISTANT

## 2024-03-13 PROCEDURE — 80048 BASIC METABOLIC PNL TOTAL CA: CPT | Performed by: PHYSICIAN ASSISTANT

## 2024-03-13 PROCEDURE — 99284 EMERGENCY DEPT VISIT MOD MDM: CPT | Mod: 25

## 2024-03-13 PROCEDURE — 87086 URINE CULTURE/COLONY COUNT: CPT | Performed by: PHYSICIAN ASSISTANT

## 2024-03-13 PROCEDURE — 85004 AUTOMATED DIFF WBC COUNT: CPT | Performed by: PHYSICIAN ASSISTANT

## 2024-03-13 PROCEDURE — 74176 CT ABD & PELVIS W/O CONTRAST: CPT

## 2024-03-13 PROCEDURE — 96360 HYDRATION IV INFUSION INIT: CPT

## 2024-03-13 PROCEDURE — 258N000003 HC RX IP 258 OP 636: Performed by: PHYSICIAN ASSISTANT

## 2024-03-13 RX ORDER — HYDROMORPHONE HYDROCHLORIDE 1 MG/ML
0.5 INJECTION, SOLUTION INTRAMUSCULAR; INTRAVENOUS; SUBCUTANEOUS EVERY 30 MIN PRN
Status: DISCONTINUED | OUTPATIENT
Start: 2024-03-13 | End: 2024-03-13 | Stop reason: HOSPADM

## 2024-03-13 RX ORDER — DIMENHYDRINATE 50 MG
50 TABLET ORAL ONCE
Status: COMPLETED | OUTPATIENT
Start: 2024-03-13 | End: 2024-03-13

## 2024-03-13 RX ORDER — OXYCODONE HYDROCHLORIDE 5 MG/1
5 TABLET ORAL EVERY 6 HOURS PRN
Qty: 12 TABLET | Refills: 0 | Status: SHIPPED | OUTPATIENT
Start: 2024-03-13 | End: 2024-03-17

## 2024-03-13 RX ORDER — ONDANSETRON 4 MG/1
4 TABLET, ORALLY DISINTEGRATING ORAL EVERY 8 HOURS PRN
Qty: 12 TABLET | Refills: 0 | Status: SHIPPED | OUTPATIENT
Start: 2024-03-13 | End: 2024-03-16

## 2024-03-13 RX ORDER — ONDANSETRON 2 MG/ML
4 INJECTION INTRAMUSCULAR; INTRAVENOUS EVERY 30 MIN PRN
Status: DISCONTINUED | OUTPATIENT
Start: 2024-03-13 | End: 2024-03-13 | Stop reason: HOSPADM

## 2024-03-13 RX ORDER — TAMSULOSIN HYDROCHLORIDE 0.4 MG/1
0.4 CAPSULE ORAL DAILY
Qty: 5 CAPSULE | Refills: 0 | Status: SHIPPED | OUTPATIENT
Start: 2024-03-13 | End: 2024-03-18

## 2024-03-13 RX ADMIN — DIMENHYDRINATE 50 MG: 50 TABLET ORAL at 12:35

## 2024-03-13 RX ADMIN — SODIUM CHLORIDE 1000 ML: 9 INJECTION, SOLUTION INTRAVENOUS at 12:26

## 2024-03-13 ASSESSMENT — COLUMBIA-SUICIDE SEVERITY RATING SCALE - C-SSRS
1. IN THE PAST MONTH, HAVE YOU WISHED YOU WERE DEAD OR WISHED YOU COULD GO TO SLEEP AND NOT WAKE UP?: NO
2. HAVE YOU ACTUALLY HAD ANY THOUGHTS OF KILLING YOURSELF IN THE PAST MONTH?: NO
6. HAVE YOU EVER DONE ANYTHING, STARTED TO DO ANYTHING, OR PREPARED TO DO ANYTHING TO END YOUR LIFE?: NO

## 2024-03-13 ASSESSMENT — ACTIVITIES OF DAILY LIVING (ADL)
ADLS_ACUITY_SCORE: 35
ADLS_ACUITY_SCORE: 35
ADLS_ACUITY_SCORE: 33
ADLS_ACUITY_SCORE: 35

## 2024-03-13 NOTE — DISCHARGE INSTRUCTIONS
Use ibuprofen 400mg every 6 hours for pain.  Use oxycodone for breakthrough pain. This is sedating. Do not drive after taking.  Use Zofran for nausea control.  You may additionally use Dramamine 25mg every 6 hours for pain.  Ensure you are staying hydrated.  Use Flomax to help facilitate passage of stone.

## 2024-03-13 NOTE — ED PROVIDER NOTES
"  History     Chief Complaint:  Flank Pain       The history is provided by the patient.      Kiran Espino is a 63 year old female with a history of kidney stones who presents with waxing and waning right flank pain since 0630 this morning. She describes the pain as a sharp, spasming sensation, and nothing seems to alleviate it. Additionally, the pain wraps around to the RLQ of her abdomen. She did not taken any medications for pain management prior to arrival. Other symptoms mentioned include nausea, vomiting, and chills. She is unable to keep fluids down due to the vomiting, and feels dehydrated at bedside. She notes that the her pain feels similar to prior kidney stones, but they have never lasted this long. She has no prior abdominal surgeries. She denies fever, hematuria, dysuria, frequency, hematochezia, constipation, vaginal bleeding, or vaginal discharge.     Independent Historian:   None - Patient Only    Review of External Notes:   None    Medications:    The patient denies current use of medications.     Past Medical History:    Melanoma   DVT   Palpitations     Past Surgical History:    Femoral lymph node dissection   Groin mass excision   R neck cystectomy     Physical Exam   Patient Vitals for the past 24 hrs:   BP Temp Temp src Pulse Resp SpO2 Height Weight   03/13/24 1233 129/63 -- -- 80 20 100 % -- --   03/13/24 1120 114/67 97.5  F (36.4  C) Oral 63 18 100 % 1.6 m (5' 3\") 61.2 kg (135 lb)        Physical Exam  Constitutional: Pleasant. Cooperative.   Eyes: Pupils equally round and reactive  HENT: Head is normal in appearance. Oropharynx is normal with moist mucus membranes.  Cardiovascular: Regular rate and rhythm and without murmurs.  Respiratory: Normal respiratory effort, lungs are clear bilaterally.  GI: Abdomen is soft, non-tender, non-distended. No guarding, rebound, or rigidity.  Musculoskeletal: No asymmetry of the lower extremities. No CVA TTP.  Skin: Normal, without rash.  Neurologic: " Cranial nerves grossly intact, normal cognition, no focal deficits. Alert and oriented x 3.   Psychiatric: Normal affect.  Nursing notes and vital signs reviewed.      Emergency Department Course   Imaging:  CT Abdomen Pelvis w/o Contrast   Final Result   IMPRESSION:    1.  1.4 cm stone at ureteropelvic junction on the right with moderate   proximal hydronephrosis.   2.  Numerous bilateral nonobstructing stones measuring up to 6 mm.      LUZ MARIA VARNER MD            SYSTEM ID:  [id number]         Results per radiology     Laboratory:  Labs Ordered and Resulted from Time of ED Arrival to Time of ED Departure   ROUTINE UA WITH MICROSCOPIC REFLEX TO CULTURE - Abnormal       Result Value    Color Urine Yellow      Appearance Urine Slightly Cloudy (*)     Glucose Urine Negative      Bilirubin Urine Negative      Ketones Urine 40 (*)     Specific Gravity Urine 1.025      Blood Urine Large (*)     pH Urine 8.0 (*)     Protein Albumin Urine 50 (*)     Urobilinogen Urine Normal      Nitrite Urine Negative      Leukocyte Esterase Urine Small (*)     Bacteria Urine Few (*)     Mucus Urine Present (*)     RBC Urine >182 (*)     WBC Urine 21 (*)     Squamous Epithelials Urine <1      Hyaline Casts Urine 2     BASIC METABOLIC PANEL - Abnormal    Sodium 139      Potassium 4.3      Chloride 105      Carbon Dioxide (CO2) 20 (*)     Anion Gap 14      Urea Nitrogen 20.1      Creatinine 0.92      GFR Estimate 70      Calcium 10.9 (*)     Glucose 137 (*)    CBC WITH PLATELETS AND DIFFERENTIAL - Abnormal    WBC Count 12.2 (*)     RBC Count 4.68      Hemoglobin 14.1      Hematocrit 41.0      MCV 88      MCH 30.1      MCHC 34.4      RDW 12.5      Platelet Count 261      % Neutrophils 92      % Lymphocytes 4      % Monocytes 4      % Eosinophils 0      % Basophils 0      % Immature Granulocytes 0      NRBCs per 100 WBC 0      Absolute Neutrophils 11.1 (*)     Absolute Lymphocytes 0.5 (*)     Absolute Monocytes 0.5      Absolute Eosinophils 0.0       Absolute Basophils 0.0      Absolute Immature Granulocytes 0.0      Absolute NRBCs 0.0     URINE CULTURE     Emergency Department Course & Assessments:    Interventions:  Medications   ondansetron (ZOFRAN) injection 4 mg (has no administration in time range)   HYDROmorphone (PF) (DILAUDID) injection 0.5 mg (has no administration in time range)   sodium chloride 0.9% BOLUS 1,000 mL (0 mLs Intravenous Stopped 3/13/24 1326)   dimenhyDRINATE (DRAMAMINE) tablet 50 mg (50 mg Oral $Given 3/13/24 1235)      Independent Interpretation (X-rays, CTs, rhythm strip):  None    Consultations/Discussion of Management or Tests:  Case discussed with urology  Case discussed with urology, option for admission for pain control with possible stenting vs discharge if pain controlled and video visit tomorrow.    Social Determinants of Health affecting care:   None    Disposition:  The patient was discharged.     Impression & Plan    CMS Diagnoses: None    Medical Decision Making:  Kiran Espino is a 63 year old female with a history of kidney stones who presents to ED for evaluation of right-sided flank pain.  See HPI as above for additional details.  Vitals and physical exam as above.  Differential is broad and included kidney stone, pyelonephritis, shingles, appendicitis, SBO, perforated viscus, referred pain from back, amongst others.  Workup obtained as above suggestive for kidney stone.  No suggestion for kidney dysfunction nor clear UTI at this time.  Given large size of stone, case discussed with urology.  Ultimately, patient given option for admission for pain control and stenting versus discharge to home given reassuring kidney function and no suggestion for infection with plan for video visit with urology tomorrow.  Patient ultimately would prefer the latter.  Pain controlled with medications as above.  Will send patient home with medications as below.  Discussed narcotic precautions.  Discussed low threshold to return  with any worsening symptoms.  All questions answered.  Patient discharged home in stable condition.    Diagnosis:    ICD-10-CM    1. Kidney stone  N20.0            Discharge Medications:  Discharge Medication List as of 3/13/2024  3:02 PM        START taking these medications    Details   ondansetron (ZOFRAN ODT) 4 MG ODT tab Take 1 tablet (4 mg) by mouth every 8 hours as needed for nausea, Disp-12 tablet, R-0, E-Prescribe      oxyCODONE (ROXICODONE) 5 MG tablet Take 1 tablet (5 mg) by mouth every 6 hours as needed for severe pain If pain is not improved with acetaminophen and ibuprofen., Disp-12 tablet, R-0, E-Prescribe      tamsulosin (FLOMAX) 0.4 MG capsule Take 1 capsule (0.4 mg) by mouth daily for 5 days, Disp-5 capsule, R-0, E-Prescribe              Scribe Disclosure:  I, Steve Perez, am serving as a scribe at 11:57 AM on 3/13/2024 to document services personally performed by Tien Kelly PA-C based on my observations and the provider's statements to me.   3/13/2024   Tien Kelly PA-C     This record was created at least in part using electronic voice recognition software, so please excuse any typographical errors.       Tien Kelly PA-C  03/13/24 0213

## 2024-03-13 NOTE — TELEPHONE ENCOUNTER
[facility] Call Center    Phone Message    May a detailed message be left on voicemail: yes     Reason for Call: Other: pt at ER now and was told she needs to see somebody tomorrow, she has a 14 ml stone, please advise     Action Taken: Other: urology    Travel Screening: Not Applicable

## 2024-03-13 NOTE — ED TRIAGE NOTES
Pt presents for evaluation of right flank pain that radiates in to the RLQ. Associated nausea, dry heaving and emesis. Started this morning around 0630. Hx of kidney stones, feels similar. Denies urinary symptoms. No meds taken PTA.

## 2024-03-13 NOTE — TELEPHONE ENCOUNTER
calls with wife in the background     He explains that she is having significant right lower back pain that wraps around her side into her lower abdomen  The pain started abruptly around 630 am today     She is experiencing severe pain that comes and goes- writer can hear the patient grunting in pain     He explains she feels nauseated but has not thrown up  No blood in the urine     Recommendations   Advised ER and rationale -  will drive her- patient agreeable to plan.     Reason for Disposition   SEVERE back pain of sudden onset and age > 60 years   Abdominal pain and age > 60 years    Additional Information   Negative: Passed out (i.e., fainted, collapsed and was not responding)   Negative: Shock suspected (e.g., cold/pale/clammy skin, too weak to stand, low BP, rapid pulse)   Negative: Sounds like a life-threatening emergency to the triager   Negative: Major injury to the back (e.g., MVA, fall > 10 feet or 3 meters, penetrating injury, etc.)   Negative: Pain in the upper back over the ribs (rib cage) that radiates (travels) into the chest   Negative: Pain in the upper back over the ribs (rib cage) and worsened by coughing (or clearly increases with breathing)   Negative: Back pain during pregnancy   Negative: Blood in urine (red, pink, or tea-colored)    Protocols used: Back Pain-A-[place]

## 2024-03-14 ENCOUNTER — VIRTUAL VISIT (OUTPATIENT)
Dept: UROLOGY | Facility: CLINIC | Age: 64
End: 2024-03-14
Payer: COMMERCIAL

## 2024-03-14 DIAGNOSIS — N20.0 KIDNEY STONE: Primary | ICD-10-CM

## 2024-03-14 LAB — BACTERIA UR CULT: NORMAL

## 2024-03-14 PROCEDURE — 99204 OFFICE O/P NEW MOD 45 MIN: CPT | Mod: 95 | Performed by: UROLOGY

## 2024-03-14 ASSESSMENT — PAIN SCALES - GENERAL: PAINLEVEL: NO PAIN (0)

## 2024-03-14 NOTE — PROGRESS NOTES
[facility]  Main Office: [address]  [address]  [place] [address]       CHIEF COMPLAINT:   Right kidney and ureteral stone    HISTORY:   This is a 63-year-old woman who was in the emergency department yesterday with acute onset of right flank pain.  She has no known prior stone history but says that she did have a similar episode once in 2017 that resolved quickly.  She was never evaluated at that time.  In the emergency department yesterday her urinalysis showed hematuria and other labs are unremarkable.  She had had no fevers chills nausea or vomiting.  She had a CT scan performed that showed a large right UPJ stone present.  She was discharged in the emergency department.  She says she overall did well last night with minimal pain.      PAST MEDICAL HISTORY:   Past Medical History:   Diagnosis Date    CARDIOVASCULAR SCREENING; LDL GOAL LESS THAN 130     Irregular heart beat     'benign' per pt    Melanoma (H) 2012    Melanoma - left inguinal lymph node       PAST SURGICAL HISTORY:     Past Surgical History:   Procedure Laterality Date    ENDOSCOPIC DISSECTION FEMORAL LYMPH NODE  12/20/2012    Procedure: ENDOSCOPIC DISSECTION FEMORAL LYMPH NODE;  Left Endoscopic Femoral Lymph Node Dissection converted to open @ 1404;  Surgeon: Sergio Raines MD;  Location: [facility] OR    EXCISE MASS GROIN  11/5/2012    Procedure: EXCISE MASS GROIN;  Left Groin exploration, Excision lymph node biopsy;  Surgeon: Hedy Santillan MD;  Location:  OR    HEAD & NECK SURGERY  2000    cystectomy on right        FAMILY HISTORY:   Family History   Problem Relation Age of Onset    Arthritis Mother     Hypertension Father     Coronary Artery Disease Paternal Grandfather     Diabetes Maternal Uncle     Hyperlipidemia No family hx of     Cerebrovascular Disease No family hx of     Breast Cancer No family hx of     Colon Cancer No family hx of        SOCIAL HISTORY:   Social History     Tobacco Use    Smoking status: Never     Smokeless tobacco: Never   Substance Use Topics    Alcohol use: Yes     Alcohol/week: 14.0 standard drinks of alcohol     Types: 7 Glasses of wine, 7 Standard drinks or equivalent per week     Comment: 7/wk        ALLERGIES:  No Known Allergies    MEDICATIONS:     Current Outpatient Medications:     Ascorbic Acid (VITAMIN C) 500 MG CHEW, Take 1,000 mg by mouth daily., Disp: , Rfl:     ondansetron (ZOFRAN ODT) 4 MG ODT tab, Take 1 tablet (4 mg) by mouth every 8 hours as needed for nausea, Disp: 12 tablet, Rfl: 0    oxyCODONE (ROXICODONE) 5 MG tablet, Take 1 tablet (5 mg) by mouth every 6 hours as needed for severe pain If pain is not improved with acetaminophen and ibuprofen., Disp: 12 tablet, Rfl: 0    tamsulosin (FLOMAX) 0.4 MG capsule, Take 1 capsule (0.4 mg) by mouth daily for 5 days, Disp: 5 capsule, Rfl: 0    scopolamine (TRANSDERM) 1 MG/3DAYS 72 hr patch, Place 1 patch onto the skin every 72 hours (Patient not taking: Reported on 10/18/2019), Disp: 2 patch, Rfl: 0  No current facility-administered medications for this visit.    REVIEW OF SYSTEMS:  Allergic/Immunologic: negative  Constitutional Symptoms: negative   Fever: none   Weight loss: none   Other: none  Hematologic/Lymphatic: negative  Integumentary: negative   Breast: negative   Hair: negative   Skin: negative   Skin: negative  Eyes: negative  Ears/Nose/Throat: negative  Respiratory: negative  Cardiovascular: negative  Gastrointestinal: negative  Genitourinary: negative  Musculoskeletal: negative  Neurologic: negative  Psychiatric: negative  Reproductive System: negative  Endocrine: negative      PHYSICAL EXAM:    General: Alert and oriented to time, place, and self. In NAD   HEENT: Head AT/NC, EOMI, CN Grossly intact   Lungs: no respiratory distress, or pursed lip breathing   Heart: No obvious jugular venous distension present   Musculoskeltal: Normal movements. Normal appearing musculature  Skin: no suspicious lesions or rashes   Neuro: Alert,  oriented, speech and mentation normal; moving all 4 extremities equally.   Psych: affect and mood normal      Laboratory Studies:     Imaging Studies: I personally reviewed her CT scan images.  Large stone greater than 1 cm at the right UPJ.  2 or 3 smaller stones in the right kidney as well that are nonobstructing      CLINICAL IMPRESSION:   Right UPJ and kidney stones    PLAN:   We discussed her CT scan findings.  I counseled her that the stone is too large and it would not be expected to pass spontaneously.  Therefore I recommend intervention.  We discussed surgical treatment options.  I recommended we proceed to the operating for cystoscopy, right-sided ureteroscopy, laser lithotripsy of stone basketing, right ureteral stent placement.  We discussed the risks of the procedure along with its expected recovery and she wishes to proceed.  In particular, I discussed the risks that she may require multiple procedures in order to remove her stone burden and she understands this.  We will get her scheduled at the next available operative time for outpatient surgery.      Steve Sanabria M.D.      Virtual Visit Details    Type of service:  Video Visit     Originating Location (pt. Location): Home    Distant Location (provider location):  On-site  Platform used for Video Visit: Blanca

## 2024-03-14 NOTE — LETTER
3/14/2024       RE: Kiran Espino  [address]  [facility] [address]     Dear Colleague,    Thank you for referring your patient, Kiran Espino, to the [facility] [place] [facility]. Please see a copy of my visit note below.    [facility]  Main Office: [address]  [address]  [place] [address]       CHIEF COMPLAINT:   Right kidney and ureteral stone    HISTORY:   This is a 63-year-old woman who was in the emergency department yesterday with acute onset of right flank pain.  She has no known prior stone history but says that she did have a similar episode once in 2017 that resolved quickly.  She was never evaluated at that time.  In the emergency department yesterday her urinalysis showed hematuria and other labs are unremarkable.  She had had no fevers chills nausea or vomiting.  She had a CT scan performed that showed a large right UPJ stone present.  She was discharged in the emergency department.  She says she overall did well last night with minimal pain.      PAST MEDICAL HISTORY:   Past Medical History:   Diagnosis Date    CARDIOVASCULAR SCREENING; LDL GOAL LESS THAN 130     Irregular heart beat     'benign' per pt    Melanoma (H) 2012    Melanoma - left inguinal lymph node       PAST SURGICAL HISTORY:     Past Surgical History:   Procedure Laterality Date    ENDOSCOPIC DISSECTION FEMORAL LYMPH NODE  12/20/2012    Procedure: ENDOSCOPIC DISSECTION FEMORAL LYMPH NODE;  Left Endoscopic Femoral Lymph Node Dissection converted to open @ 1404;  Surgeon: Sergio Raines MD;  Location: [facility] OR    EXCISE MASS GROIN  11/5/2012    Procedure: EXCISE MASS GROIN;  Left Groin exploration, Excision lymph node biopsy;  Surgeon: Hedy Santillan MD;  Location:  OR    HEAD & NECK SURGERY  2000    cystectomy on right        FAMILY HISTORY:   Family History   Problem Relation Age of Onset    Arthritis Mother     Hypertension Father      Coronary Artery Disease Paternal Grandfather     Diabetes Maternal Uncle     Hyperlipidemia No family hx of     Cerebrovascular Disease No family hx of     Breast Cancer No family hx of     Colon Cancer No family hx of        SOCIAL HISTORY:   Social History     Tobacco Use    Smoking status: Never    Smokeless tobacco: Never   Substance Use Topics    Alcohol use: Yes     Alcohol/week: 14.0 standard drinks of alcohol     Types: 7 Glasses of wine, 7 Standard drinks or equivalent per week     Comment: 7/wk        ALLERGIES:  No Known Allergies    MEDICATIONS:     Current Outpatient Medications:     Ascorbic Acid (VITAMIN C) 500 MG CHEW, Take 1,000 mg by mouth daily., Disp: , Rfl:     ondansetron (ZOFRAN ODT) 4 MG ODT tab, Take 1 tablet (4 mg) by mouth every 8 hours as needed for nausea, Disp: 12 tablet, Rfl: 0    oxyCODONE (ROXICODONE) 5 MG tablet, Take 1 tablet (5 mg) by mouth every 6 hours as needed for severe pain If pain is not improved with acetaminophen and ibuprofen., Disp: 12 tablet, Rfl: 0    tamsulosin (FLOMAX) 0.4 MG capsule, Take 1 capsule (0.4 mg) by mouth daily for 5 days, Disp: 5 capsule, Rfl: 0    scopolamine (TRANSDERM) 1 MG/3DAYS 72 hr patch, Place 1 patch onto the skin every 72 hours (Patient not taking: Reported on 10/18/2019), Disp: 2 patch, Rfl: 0  No current facility-administered medications for this visit.    REVIEW OF SYSTEMS:  Allergic/Immunologic: negative  Constitutional Symptoms: negative   Fever: none   Weight loss: none   Other: none  Hematologic/Lymphatic: negative  Integumentary: negative   Breast: negative   Hair: negative   Skin: negative   Skin: negative  Eyes: negative  Ears/Nose/Throat: negative  Respiratory: negative  Cardiovascular: negative  Gastrointestinal: negative  Genitourinary: negative  Musculoskeletal: negative  Neurologic: negative  Psychiatric: negative  Reproductive System: negative  Endocrine: negative      PHYSICAL EXAM:    General: Alert and oriented to time,  place, and self. In NAD   HEENT: Head AT/NC, EOMI, CN Grossly intact   Lungs: no respiratory distress, or pursed lip breathing   Heart: No obvious jugular venous distension present   Musculoskeltal: Normal movements. Normal appearing musculature  Skin: no suspicious lesions or rashes   Neuro: Alert, oriented, speech and mentation normal; moving all 4 extremities equally.   Psych: affect and mood normal      Laboratory Studies:     Imaging Studies: I personally reviewed her CT scan images.  Large stone greater than 1 cm at the right UPJ.  2 or 3 smaller stones in the right kidney as well that are nonobstructing      CLINICAL IMPRESSION:   Right UPJ and kidney stones    PLAN:   We discussed her CT scan findings.  I counseled her that the stone is too large and it would not be expected to pass spontaneously.  Therefore I recommend intervention.  We discussed surgical treatment options.  I recommended we proceed to the operating for cystoscopy, right-sided ureteroscopy, laser lithotripsy of stone basketing, right ureteral stent placement.  We discussed the risks of the procedure along with its expected recovery and she wishes to proceed.  In particular, I discussed the risks that she may require multiple procedures in order to remove her stone burden and she understands this.  We will get her scheduled at the next available operative time for outpatient surgery.      Steve Sanabria M.D.      Virtual Visit Details    Type of service:  Video Visit     Originating Location (pt. Location): Home    Distant Location (provider location):  On-site  Platform used for Video Visit: Blanca

## 2024-03-14 NOTE — NURSING NOTE
Is the patient currently in the state of [place]? YES    Visit mode:VIDEO    If the visit is dropped, the patient can be reconnected by: VIDEO VISIT: Text to cell phone:   Telephone Information:   Mobile [phone]       Will anyone else be joining the visit? NO  (If patient encounters technical issues they should call [phone]    How would you like to obtain your AVS? MyChart    Are changes needed to the allergy or medication list? No    Reason for visit: Consult    Lakeisha CABRERA

## 2024-03-15 ENCOUNTER — APPOINTMENT (OUTPATIENT)
Dept: GENERAL RADIOLOGY | Facility: CLINIC | Age: 64
End: 2024-03-15
Attending: UROLOGY
Payer: COMMERCIAL

## 2024-03-15 ENCOUNTER — ANESTHESIA EVENT (OUTPATIENT)
Dept: SURGERY | Facility: CLINIC | Age: 64
End: 2024-03-15
Payer: COMMERCIAL

## 2024-03-15 ENCOUNTER — ANESTHESIA (OUTPATIENT)
Dept: SURGERY | Facility: CLINIC | Age: 64
End: 2024-03-15
Payer: COMMERCIAL

## 2024-03-15 ENCOUNTER — HOSPITAL ENCOUNTER (OUTPATIENT)
Facility: CLINIC | Age: 64
Discharge: HOME | End: 2024-03-15
Attending: UROLOGY | Admitting: UROLOGY
Payer: COMMERCIAL

## 2024-03-15 VITALS
TEMPERATURE: 97 F | RESPIRATION RATE: 9 BRPM | DIASTOLIC BLOOD PRESSURE: 72 MMHG | OXYGEN SATURATION: 99 % | SYSTOLIC BLOOD PRESSURE: 138 MMHG | HEART RATE: 77 BPM | BODY MASS INDEX: 24.43 KG/M2 | WEIGHT: 137.9 LBS

## 2024-03-15 DIAGNOSIS — N20.0 KIDNEY STONE: Primary | ICD-10-CM

## 2024-03-15 PROCEDURE — 250N000011 HC RX IP 250 OP 636: Performed by: UROLOGY

## 2024-03-15 PROCEDURE — 74420 UROGRAPHY RTRGR +-KUB: CPT | Mod: 26 | Performed by: UROLOGY

## 2024-03-15 PROCEDURE — C2617 STENT, NON-COR, TEM W/O DEL: HCPCS | Performed by: UROLOGY

## 2024-03-15 PROCEDURE — 272N000001 HC OR GENERAL SUPPLY STERILE: Performed by: UROLOGY

## 2024-03-15 PROCEDURE — 258N000003 HC RX IP 258 OP 636: Performed by: ANESTHESIOLOGY

## 2024-03-15 PROCEDURE — 710N000009 HC RECOVERY PHASE 1, LEVEL 1, PER MIN: Performed by: UROLOGY

## 2024-03-15 PROCEDURE — 52356 CYSTO/URETERO W/LITHOTRIPSY: CPT | Mod: RT | Performed by: UROLOGY

## 2024-03-15 PROCEDURE — 360N000083 HC SURGERY LEVEL 3 W/ FLUORO, PER MIN: Performed by: UROLOGY

## 2024-03-15 PROCEDURE — C1769 GUIDE WIRE: HCPCS | Performed by: UROLOGY

## 2024-03-15 PROCEDURE — 999N000141 HC STATISTIC PRE-PROCEDURE NURSING ASSESSMENT: Performed by: UROLOGY

## 2024-03-15 PROCEDURE — 255N000002 HC RX 255 OP 636: Performed by: UROLOGY

## 2024-03-15 PROCEDURE — C1894 INTRO/SHEATH, NON-LASER: HCPCS | Performed by: UROLOGY

## 2024-03-15 PROCEDURE — 250N000009 HC RX 250: Performed by: UROLOGY

## 2024-03-15 PROCEDURE — 82365 CALCULUS SPECTROSCOPY: CPT | Performed by: UROLOGY

## 2024-03-15 PROCEDURE — 250N000009 HC RX 250: Performed by: ANESTHESIOLOGY

## 2024-03-15 PROCEDURE — 999N000179 XR SURGERY CARM FLUORO LESS THAN 5 MIN W STILLS: Mod: TC

## 2024-03-15 PROCEDURE — 258N000001 HC RX 258: Performed by: UROLOGY

## 2024-03-15 PROCEDURE — 250N000011 HC RX IP 250 OP 636: Performed by: ANESTHESIOLOGY

## 2024-03-15 PROCEDURE — 370N000017 HC ANESTHESIA TECHNICAL FEE, PER MIN: Performed by: UROLOGY

## 2024-03-15 PROCEDURE — C1758 CATHETER, URETERAL: HCPCS | Performed by: UROLOGY

## 2024-03-15 PROCEDURE — 250N000025 HC SEVOFLURANE, PER MIN: Performed by: UROLOGY

## 2024-03-15 PROCEDURE — 710N000012 HC RECOVERY PHASE 2, PER MINUTE: Performed by: UROLOGY

## 2024-03-15 DEVICE — STENT URETERAL POLARIS ULTRA 5FRX24CM M0061921220: Type: IMPLANTABLE DEVICE | Site: URETER | Status: FUNCTIONAL

## 2024-03-15 RX ORDER — ONDANSETRON 4 MG/1
4 TABLET, ORALLY DISINTEGRATING ORAL EVERY 30 MIN PRN
Status: DISCONTINUED | OUTPATIENT
Start: 2024-03-15 | End: 2024-03-15 | Stop reason: HOSPADM

## 2024-03-15 RX ORDER — GLYCOPYRROLATE 0.2 MG/ML
INJECTION, SOLUTION INTRAMUSCULAR; INTRAVENOUS PRN
Status: DISCONTINUED | OUTPATIENT
Start: 2024-03-15 | End: 2024-03-15

## 2024-03-15 RX ORDER — SODIUM CHLORIDE, SODIUM LACTATE, POTASSIUM CHLORIDE, CALCIUM CHLORIDE 600; 310; 30; 20 MG/100ML; MG/100ML; MG/100ML; MG/100ML
INJECTION, SOLUTION INTRAVENOUS CONTINUOUS PRN
Status: DISCONTINUED | OUTPATIENT
Start: 2024-03-15 | End: 2024-03-15

## 2024-03-15 RX ORDER — NALOXONE HYDROCHLORIDE 0.4 MG/ML
0.1 INJECTION, SOLUTION INTRAMUSCULAR; INTRAVENOUS; SUBCUTANEOUS
Status: DISCONTINUED | OUTPATIENT
Start: 2024-03-15 | End: 2024-03-15 | Stop reason: HOSPADM

## 2024-03-15 RX ORDER — DEXAMETHASONE SODIUM PHOSPHATE 4 MG/ML
INJECTION, SOLUTION INTRA-ARTICULAR; INTRALESIONAL; INTRAMUSCULAR; INTRAVENOUS; SOFT TISSUE PRN
Status: DISCONTINUED | OUTPATIENT
Start: 2024-03-15 | End: 2024-03-15

## 2024-03-15 RX ORDER — HYDROMORPHONE HCL IN WATER/PF 6 MG/30 ML
0.2 PATIENT CONTROLLED ANALGESIA SYRINGE INTRAVENOUS EVERY 5 MIN PRN
Status: DISCONTINUED | OUTPATIENT
Start: 2024-03-15 | End: 2024-03-15 | Stop reason: HOSPADM

## 2024-03-15 RX ORDER — ONDANSETRON 2 MG/ML
4 INJECTION INTRAMUSCULAR; INTRAVENOUS EVERY 30 MIN PRN
Status: DISCONTINUED | OUTPATIENT
Start: 2024-03-15 | End: 2024-03-15 | Stop reason: HOSPADM

## 2024-03-15 RX ORDER — FENTANYL CITRATE 50 UG/ML
INJECTION, SOLUTION INTRAMUSCULAR; INTRAVENOUS PRN
Status: DISCONTINUED | OUTPATIENT
Start: 2024-03-15 | End: 2024-03-15

## 2024-03-15 RX ORDER — LIDOCAINE 40 MG/G
CREAM TOPICAL
Status: DISCONTINUED | OUTPATIENT
Start: 2024-03-15 | End: 2024-03-15 | Stop reason: HOSPADM

## 2024-03-15 RX ORDER — OXYCODONE HYDROCHLORIDE 5 MG/1
5 TABLET ORAL
Status: DISCONTINUED | OUTPATIENT
Start: 2024-03-15 | End: 2024-03-15 | Stop reason: HOSPADM

## 2024-03-15 RX ORDER — FENTANYL CITRATE 50 UG/ML
50 INJECTION, SOLUTION INTRAMUSCULAR; INTRAVENOUS EVERY 5 MIN PRN
Status: DISCONTINUED | OUTPATIENT
Start: 2024-03-15 | End: 2024-03-15 | Stop reason: HOSPADM

## 2024-03-15 RX ORDER — CEFAZOLIN SODIUM/WATER 2 G/20 ML
2 SYRINGE (ML) INTRAVENOUS
Status: DISCONTINUED | OUTPATIENT
Start: 2024-03-15 | End: 2024-03-15 | Stop reason: HOSPADM

## 2024-03-15 RX ORDER — FENTANYL CITRATE 50 UG/ML
25 INJECTION, SOLUTION INTRAMUSCULAR; INTRAVENOUS EVERY 5 MIN PRN
Status: DISCONTINUED | OUTPATIENT
Start: 2024-03-15 | End: 2024-03-15 | Stop reason: HOSPADM

## 2024-03-15 RX ORDER — LIDOCAINE HYDROCHLORIDE 20 MG/ML
INJECTION, SOLUTION INFILTRATION; PERINEURAL PRN
Status: DISCONTINUED | OUTPATIENT
Start: 2024-03-15 | End: 2024-03-15

## 2024-03-15 RX ORDER — TAMSULOSIN HYDROCHLORIDE 0.4 MG/1
0.4 CAPSULE ORAL DAILY
Qty: 5 CAPSULE | Refills: 0 | Status: SHIPPED | OUTPATIENT
Start: 2024-03-15 | End: 2024-03-20

## 2024-03-15 RX ORDER — OXYCODONE HYDROCHLORIDE 5 MG/1
10 TABLET ORAL
Status: DISCONTINUED | OUTPATIENT
Start: 2024-03-15 | End: 2024-03-15 | Stop reason: HOSPADM

## 2024-03-15 RX ORDER — HYDROMORPHONE HCL IN WATER/PF 6 MG/30 ML
0.4 PATIENT CONTROLLED ANALGESIA SYRINGE INTRAVENOUS EVERY 5 MIN PRN
Status: DISCONTINUED | OUTPATIENT
Start: 2024-03-15 | End: 2024-03-15 | Stop reason: HOSPADM

## 2024-03-15 RX ORDER — ONDANSETRON 2 MG/ML
INJECTION INTRAMUSCULAR; INTRAVENOUS PRN
Status: DISCONTINUED | OUTPATIENT
Start: 2024-03-15 | End: 2024-03-15

## 2024-03-15 RX ORDER — SODIUM CHLORIDE, SODIUM LACTATE, POTASSIUM CHLORIDE, CALCIUM CHLORIDE 600; 310; 30; 20 MG/100ML; MG/100ML; MG/100ML; MG/100ML
INJECTION, SOLUTION INTRAVENOUS CONTINUOUS
Status: DISCONTINUED | OUTPATIENT
Start: 2024-03-15 | End: 2024-03-15 | Stop reason: HOSPADM

## 2024-03-15 RX ORDER — PROPOFOL 10 MG/ML
INJECTION, EMULSION INTRAVENOUS PRN
Status: DISCONTINUED | OUTPATIENT
Start: 2024-03-15 | End: 2024-03-15

## 2024-03-15 RX ORDER — CEFAZOLIN SODIUM/WATER 2 G/20 ML
2 SYRINGE (ML) INTRAVENOUS SEE ADMIN INSTRUCTIONS
Status: DISCONTINUED | OUTPATIENT
Start: 2024-03-15 | End: 2024-03-15 | Stop reason: HOSPADM

## 2024-03-15 RX ADMIN — GLYCOPYRROLATE 0.2 MG: 0.2 INJECTION, SOLUTION INTRAMUSCULAR; INTRAVENOUS at 15:17

## 2024-03-15 RX ADMIN — Medication 2 G: at 15:09

## 2024-03-15 RX ADMIN — ONDANSETRON 4 MG: 2 INJECTION INTRAMUSCULAR; INTRAVENOUS at 15:26

## 2024-03-15 RX ADMIN — FENTANYL CITRATE 100 MCG: 50 INJECTION INTRAMUSCULAR; INTRAVENOUS at 15:17

## 2024-03-15 RX ADMIN — MIDAZOLAM 2 MG: 1 INJECTION INTRAMUSCULAR; INTRAVENOUS at 15:13

## 2024-03-15 RX ADMIN — SODIUM CHLORIDE, POTASSIUM CHLORIDE, SODIUM LACTATE AND CALCIUM CHLORIDE: 600; 310; 30; 20 INJECTION, SOLUTION INTRAVENOUS at 12:55

## 2024-03-15 RX ADMIN — DEXAMETHASONE SODIUM PHOSPHATE 4 MG: 4 INJECTION, SOLUTION INTRA-ARTICULAR; INTRALESIONAL; INTRAMUSCULAR; INTRAVENOUS; SOFT TISSUE at 15:17

## 2024-03-15 RX ADMIN — PROPOFOL 200 MG: 10 INJECTION, EMULSION INTRAVENOUS at 15:17

## 2024-03-15 RX ADMIN — SODIUM CHLORIDE, POTASSIUM CHLORIDE, SODIUM LACTATE AND CALCIUM CHLORIDE: 600; 310; 30; 20 INJECTION, SOLUTION INTRAVENOUS at 15:08

## 2024-03-15 RX ADMIN — LIDOCAINE HYDROCHLORIDE 30 MG: 20 INJECTION, SOLUTION INFILTRATION; PERINEURAL at 15:17

## 2024-03-15 ASSESSMENT — ACTIVITIES OF DAILY LIVING (ADL)
ADLS_ACUITY_SCORE: 18
ADLS_ACUITY_SCORE: 20

## 2024-03-15 NOTE — ANESTHESIA POSTPROCEDURE EVALUATION
Patient: Kiran Espino    Procedure: Procedure(s):  Cystoscopy, right ureteroscopy with laser lithotripsy and stone basketing.  Right ureteral stent placement.       Anesthesia Type:  General    Note:  Disposition: Outpatient   Postop Pain Control: Uneventful            Sign Out: Well controlled pain   PONV: No   Neuro/Psych: Uneventful            Sign Out: Acceptable/Baseline neuro status   Airway/Respiratory: Uneventful            Sign Out: Acceptable/Baseline resp. status   CV/Hemodynamics: Uneventful            Sign Out: Acceptable CV status; No obvious hypovolemia; No obvious fluid overload   Other NRE: NONE   DID A NON-ROUTINE EVENT OCCUR? No           Last vitals:  Vitals Value Taken Time   /72 03/15/24 1700   Temp 97  F (36.1  C) 03/15/24 1700   Pulse 77 03/15/24 1700   Resp 9 03/15/24 1700   SpO2 99 % 03/15/24 1700       Electronically Signed By: Rustam Benton MD  March 15, 2024  5:31 PM

## 2024-03-15 NOTE — ANESTHESIA PROCEDURE NOTES
Airway       Patient location during procedure: OR       Procedure Start/Stop Times: 3/15/2024 3:19 PM  Staff -        Anesthesiologist:  Rustam Benton MD       CRNA: Tameka Dale APRN CRNA       Performed By: CRNAIndications and Patient Condition       Indications for airway management: sharon-procedural       Induction type:intravenous       Mask difficulty assessment: 0 - not attempted    Final Airway Details       Final airway type: supraglottic airway    Supraglottic Airway Details        Type: LMA       Brand: I-Gel       LMA size: 4    Post intubation assessment        Placement verified by: capnometry, equal breath sounds and chest rise        Number of attempts at approach: 1       Number of other approaches attempted: 0       Secured with: commercial tube whitley       Ease of procedure: easy       Dentition: Intact and Unchanged    Medication(s) Administered   Medication Administration Time: 3/15/2024 3:19 PM

## 2024-03-15 NOTE — ANESTHESIA CARE TRANSFER NOTE
Patient: Kiran Espino    Procedure: Procedure(s):  Cystoscopy, right ureteroscopy with laser lithotripsy and stone basketing.  Right ureteral stent placement.       Diagnosis: Kidney stone [N20.0]  Diagnosis Additional Information: No value filed.    Anesthesia Type:   General     Note:    Oropharynx: oropharynx clear of all foreign objects and spontaneously breathing  Level of Consciousness: awake  Oxygen Supplementation: face mask  Level of Supplemental Oxygen (L/min / FiO2): 7  Independent Airway: airway patency satisfactory and stable  Dentition: dentition unchanged  Vital Signs Stable: post-procedure vital signs reviewed and stable  Report to RN Given: handoff report given  Patient transferred to: PACU    Handoff Report: Identifed the Patient, Identified the Reponsible Provider, Reviewed the pertinent medical history, Discussed the surgical course, Reviewed Intra-OP anesthesia mangement and issues during anesthesia, Set expectations for post-procedure period and Allowed opportunity for questions and acknowledgement of understanding  Vitals:  Vitals Value Taken Time   BP     Temp     Pulse     Resp     SpO2         Electronically Signed By: FITZ Fleming CRNA  March 15, 2024  4:19 PM

## 2024-03-15 NOTE — DISCHARGE INSTRUCTIONS
CYSTOSCOPY DISCHARGE INSTRUCTIONS  [facility]  SALEEM CHARLES, & PRATIBHA   [phone]    YOU MAY GO BACK TO YOUR NORMAL DIET AND ACTIVITY, UNLESS YOUR DOCTOR TELLS YOU NOT TO.    FOR THE NEXT TWO DAYS, YOU MAY NOTICE:    SOME BLOOD IN YOUR URINE.  SOME BURNING WHEN YOU URINATE.  AN URGE TO URINATE MORE OFTEN.  BLADDER SPASMS.    THESE ARE NORMAL AFTER THE PROCEDURE.  THEY SHOULD GO AWAY AFTER A DAY OR TWO.  TO RELIEVE THESE PROBLEMS:     DRINK 6 TO 8 LARGE GLASSES OF WATER EACH DAY (INCLUDES DRINKS AT MEALS).  THIS WILL HELP CLEAR THE URINE.    TAKE WARM BATHS TO RELIEVE PAIN AND BLADDER SPASMS.  DO NOT ADD ANYTHING TO THE BATH WATER.    YOUR DOCTOR MAY PRESCRIBE PAIN MEDICINE.  YOU MAY ALSO TAKE TYLENOL (ACETAMINOPHEN) FOR PAIN.    CALL YOUR SURGEON IF YOU HAVE:    A FEVER OVER 101 DEGREES.  CHECK YOUR TEMPERATURE UNDER YOUR TONGUE.    CHILLS.    FAILURE TO URINATE (NO URINE COMES OUT WHEN YOU TRY TO USE THE TOILET).  TRY SOAKING IN A BATHTUB FULL OF WARM WATER.  IF STILL NO URINE, CALL YOUR DOCTOR.    A LOT OF BLOOD IN THE URINE, OR BLOOD CLOTS LARGER THAN A NICKEL.      PAIN IN THE BACK OR BELLY AREA (ABDOMEN).    PAIN OR SPASMS THAT ARE NOT RELIEVED BY WARM TUB BATHS AND PAIN MEDICINE.      SEVERE PAIN, BURNING OR OTHER PROBLEMS WHILE PASSING URINE.    PAIN THAT GETS WORSE AFTER TWO DAYS.

## 2024-03-15 NOTE — ANESTHESIA PREPROCEDURE EVALUATION
Anesthesia Pre-Procedure Evaluation    Patient: Kiran Espino   MRN: [id number] : 1960        Procedure : Procedure(s):  Cystoscopy, right ureteroscopy with laser lithotripsy and stone basketing.  Right ureteral stent placement.          Past Medical History:   Diagnosis Date    CARDIOVASCULAR SCREENING; LDL GOAL LESS THAN 130     Irregular heart beat     'benign' per pt    Melanoma (H)     Melanoma - left inguinal lymph node      Past Surgical History:   Procedure Laterality Date    ENDOSCOPIC DISSECTION FEMORAL LYMPH NODE  2012    Procedure: ENDOSCOPIC DISSECTION FEMORAL LYMPH NODE;  Left Endoscopic Femoral Lymph Node Dissection converted to open @ 1404;  Surgeon: Sergio Raines MD;  Location: [facility] OR    EXCISE MASS GROIN  2012    Procedure: EXCISE MASS GROIN;  Left Groin exploration, Excision lymph node biopsy;  Surgeon: Hedy Santillan MD;  Location: [facility] OR    HEAD & NECK SURGERY      cystectomy on right       No Known Allergies   Social History     Tobacco Use    Smoking status: Never    Smokeless tobacco: Never   Substance Use Topics    Alcohol use: Yes     Alcohol/week: 14.0 standard drinks of alcohol     Types: 7 Glasses of wine, 7 Standard drinks or equivalent per week     Comment: 7/wk      Wt Readings from Last 1 Encounters:   03/15/24 62.6 kg (137 lb 14.4 oz)        Anesthesia Evaluation   Pt has had prior anesthetic. Type: General.    No history of anesthetic complications       ROS/MED HX  ENT/Pulmonary:  - neg pulmonary ROS     Neurologic:  - neg neurologic ROS     Cardiovascular:  - neg cardiovascular ROS     METS/Exercise Tolerance:     Hematologic:  - neg hematologic  ROS     Musculoskeletal:  - neg musculoskeletal ROS     GI/Hepatic:  - neg GI/hepatic ROS     Renal/Genitourinary:     (+)       Nephrolithiasis ,       Endo:  - neg endo ROS     Psychiatric/Substance Use:  - neg psychiatric ROS     Infectious Disease:  - neg infectious disease ROS     Malignancy:        Other:            Physical Exam    Airway        Mallampati: II   TM distance: > 3 FB   Neck ROM: full   Mouth opening: > 3 cm    Respiratory Devices and Support         Dental       (+) Minor Abnormalities - some fillings, tiny chips      Cardiovascular   cardiovascular exam normal          Pulmonary   pulmonary exam normal                OUTSIDE LABS:  CBC:   Lab Results   Component Value Date    WBC 12.2 (H) 03/13/2024    WBC 7.0 11/07/2017    HGB 14.1 03/13/2024    HGB 13.9 11/07/2017    HCT 41.0 03/13/2024    HCT 41.2 11/07/2017     03/13/2024     11/07/2017     BMP:   Lab Results   Component Value Date     03/13/2024     11/07/2017    POTASSIUM 4.3 03/13/2024    POTASSIUM 4.1 11/07/2017    CHLORIDE 105 03/13/2024    CHLORIDE 105 11/07/2017    CO2 20 (L) 03/13/2024    CO2 24 11/07/2017    BUN 20.1 03/13/2024    BUN 10 11/07/2017    CR 0.92 03/13/2024    CR 0.63 11/07/2017     (H) 03/13/2024    GLC 82 11/07/2017     COAGS:   Lab Results   Component Value Date    PTT 34 02/11/2013    INR 1.41 (H) 02/11/2013    FIBR 242 02/11/2013     POC:   Lab Results   Component Value Date    BGM 97 05/21/2014    HCGS Negative 12/20/2012     HEPATIC:   Lab Results   Component Value Date    ALBUMIN 3.6 11/07/2017    PROTTOTAL 6.9 11/07/2017    ALT 30 11/07/2017    AST 18 11/07/2017    ALKPHOS 54 11/07/2017    BILITOTAL 0.4 11/07/2017     OTHER:   Lab Results   Component Value Date    NATANAEL 10.9 (H) 03/13/2024       Anesthesia Plan    ASA Status:  1       Anesthesia Type: General.     - Airway: LMA   Induction: Intravenous.   Maintenance: Balanced.        Consents    Anesthesia Plan(s) and associated risks, benefits, and realistic alternatives discussed. Questions answered and patient/representative(s) expressed understanding.     - Discussed:     - Discussed with:  Patient      - Extended Intubation/Ventilatory Support Discussed: No.      - Patient is DNR/DNI Status: No     Use of blood products  discussed: No .     Postoperative Care    Pain management: Oral pain medications, IV analgesics, Multi-modal analgesia.   PONV prophylaxis: Ondansetron (or other 5HT-3), Dexamethasone or Solumedrol     Comments:               Rustam Benton MD    I have reviewed the pertinent notes and labs in the chart from the past 30 days and (re)examined the patient.  Any updates or changes from those notes are reflected in this note.      # Hypercalcemia: Highest Ca = 10.9 mg/dL in last 30 days, will monitor as appropriate

## 2024-03-15 NOTE — OP NOTE
OPERATIVE REPORT    PREOPERATIVE DIAGNOSIS: Right kidney stone    POSTOPERATIVE DIAGNOSIS: Same    PROCEDURES PERFORMED: Cystoscopy, right retrograde pyelogram, interpretation of fluoroscopic images, right ureteroscopy with thulium laser lithotripsy and stone basketing, right ureteral stent placement    SURGEON: Steve Sanabria M.D.    ANESTHESIA: General    COMPLICATIONS: None.     SIGNIFICANT FINDINGS:       BRIEF OPERATIVE INDICATIONS: Kiran Espino is a(n) 63 year old female who presented with a proximal right ureteral stone and options for removal.  After a discussion of all risks, benefits, and alternatives, the patient elected to proceed with definitive stone management. The patient understands the potential need for more than one procedure to eliminate all stone burden.      DESCRIPTION OF PROCEDURE:  After informed consent was obtained, the patient was transported to the operating room & placed supine on the table. After adequate anesthesia was induced, the patient was placed in lithotomy and prepped and draped in the usual sterile fashion. A timeout was taken to confirm correct patient, procedure and laterality. Pre-operative IV antibiotics were administered.     I introduced the 22 Turkish rigid cystoscope through the urethra into the bladder and performed cystoscopy.  The bladder was normal throughout.  I cannulated the right  Ureteral orifice with a ureteral catheter and I performed a retrograde pyelogram.  The stone was easily visible on  film, there was minimal hydronephrosis above it.  I then passed a Glidewire into the kidney and backloaded off the ureteral catheter along with the scope.  I clamped the Glidewire to the drape.  Alongside the Glidewire I passed the rigid ureteroscope through the urethra and then up the right ureter.  There were no stones in the right ureter so I passed a second Glidewire to the right kidney backloaded off the rigid scope.  Over this second working Glidewire I  passed a 12/14 Sinhala ureteral access sheath.  I then passed the flex ureteroscope through the access sheath.  There was a single stone in the renal pelvis and a smaller stone in one of the calyces.  I used a turn a ronald on thulium laser fiber to  the larger stones multiple fragments.  These fragments then basketed out through the access sheath.  Once I removed all fragments I performed a retrograde pyelogram through the scope and I performed a complete renoscopy to make certain all fragments have been removed.  I removed the ureteroscope.  I removed the access sheath.  I then passed a 5 x 24 double-J ureteral stent over the wire.  The wire was pulled back and a good curl can be seen in the upper pole of the right kidney with fluoroscopy.  I reinserted the cystoscope and a good curl was seen in the bladder with direct visualization.  The bladder was drained and stone fragments were collected and the procedure was concluded    The patient tolerated procedure well without complications.  She went to the postanesthetic care unit in good condition.  She will go home from there.  I left strings on the stent and she will remove her own stent at home by pulling on the string on March 20.

## 2024-03-19 LAB
APPEARANCE STONE: NORMAL
COMPN STONE: NORMAL
SPECIMEN WT: 29 MG

## 2024-03-20 ENCOUNTER — TELEPHONE (OUTPATIENT)
Dept: UROLOGY | Facility: CLINIC | Age: 64
End: 2024-03-20
Payer: COMMERCIAL

## 2024-03-20 NOTE — TELEPHONE ENCOUNTER
[facility] Call Center    Phone Message    May a detailed message be left on voicemail: yes     Reason for Call: Other: Pt's  Kevin called in because they want to know if it's safe for her to take tylenol and oxycodone together? Misty call pt's  back to discuss.       Action Taken: Message routed to:  Other: Urology    Travel Screening: Not Applicable

## 2024-03-20 NOTE — TELEPHONE ENCOUNTER
Spoke with patient's  and advised him it is ok to take tylenol and Oxycodone together.  Johanna Dickson LPN

## 2024-04-13 ENCOUNTER — HEALTH MAINTENANCE LETTER (OUTPATIENT)
Age: 64
End: 2024-04-13

## 2024-12-22 ENCOUNTER — HOSPITAL ENCOUNTER (EMERGENCY)
Facility: CLINIC | Age: 64
Discharge: HOME | End: 2024-12-22
Attending: EMERGENCY MEDICINE
Payer: COMMERCIAL

## 2024-12-22 ENCOUNTER — APPOINTMENT (OUTPATIENT)
Dept: CT IMAGING | Facility: CLINIC | Age: 64
End: 2024-12-22
Attending: EMERGENCY MEDICINE
Payer: COMMERCIAL

## 2024-12-22 VITALS
DIASTOLIC BLOOD PRESSURE: 82 MMHG | TEMPERATURE: 98.9 F | BODY MASS INDEX: 23.74 KG/M2 | RESPIRATION RATE: 18 BRPM | HEART RATE: 74 BPM | HEIGHT: 63 IN | WEIGHT: 134 LBS | OXYGEN SATURATION: 100 % | SYSTOLIC BLOOD PRESSURE: 131 MMHG

## 2024-12-22 DIAGNOSIS — N20.1 URETEROLITHIASIS: ICD-10-CM

## 2024-12-22 DIAGNOSIS — R10.9 ACUTE RIGHT FLANK PAIN: ICD-10-CM

## 2024-12-22 LAB
ALBUMIN UR-MCNC: NEGATIVE MG/DL
ANION GAP SERPL CALCULATED.3IONS-SCNC: 20 MMOL/L (ref 7–15)
APPEARANCE UR: CLEAR
BACTERIA #/AREA URNS HPF: ABNORMAL /HPF
BASOPHILS # BLD AUTO: 0.1 10E3/UL (ref 0–0.2)
BASOPHILS NFR BLD AUTO: 1 %
BILIRUB UR QL STRIP: NEGATIVE
BUN SERPL-MCNC: 20 MG/DL (ref 8–23)
CALCIUM SERPL-MCNC: 11.4 MG/DL (ref 8.8–10.4)
CHLORIDE SERPL-SCNC: 104 MMOL/L (ref 98–107)
COLOR UR AUTO: ABNORMAL
CREAT SERPL-MCNC: 0.96 MG/DL (ref 0.51–0.95)
EGFRCR SERPLBLD CKD-EPI 2021: 67 ML/MIN/1.73M2
EOSINOPHIL # BLD AUTO: 0.1 10E3/UL (ref 0–0.7)
EOSINOPHIL NFR BLD AUTO: 2 %
ERYTHROCYTE [DISTWIDTH] IN BLOOD BY AUTOMATED COUNT: 12.5 % (ref 10–15)
GLUCOSE SERPL-MCNC: 129 MG/DL (ref 70–99)
GLUCOSE UR STRIP-MCNC: NEGATIVE MG/DL
HCO3 SERPL-SCNC: 18 MMOL/L (ref 22–29)
HCT VFR BLD AUTO: 39.2 % (ref 35–47)
HGB BLD-MCNC: 13.7 G/DL (ref 11.7–15.7)
HGB UR QL STRIP: ABNORMAL
HOLD SPECIMEN: NORMAL
HOLD SPECIMEN: NORMAL
IMM GRANULOCYTES # BLD: 0 10E3/UL
IMM GRANULOCYTES NFR BLD: 0 %
KETONES UR STRIP-MCNC: NEGATIVE MG/DL
LEUKOCYTE ESTERASE UR QL STRIP: NEGATIVE
LYMPHOCYTES # BLD AUTO: 3.2 10E3/UL (ref 0.8–5.3)
LYMPHOCYTES NFR BLD AUTO: 37 %
MCH RBC QN AUTO: 29.5 PG (ref 26.5–33)
MCHC RBC AUTO-ENTMCNC: 34.9 G/DL (ref 31.5–36.5)
MCV RBC AUTO: 84 FL (ref 78–100)
MONOCYTES # BLD AUTO: 0.8 10E3/UL (ref 0–1.3)
MONOCYTES NFR BLD AUTO: 9 %
MUCOUS THREADS #/AREA URNS LPF: PRESENT /LPF
NEUTROPHILS # BLD AUTO: 4.4 10E3/UL (ref 1.6–8.3)
NEUTROPHILS NFR BLD AUTO: 52 %
NITRATE UR QL: NEGATIVE
NRBC # BLD AUTO: 0 10E3/UL
NRBC BLD AUTO-RTO: 0 /100
PH UR STRIP: 8.5 [PH] (ref 5–7)
PLATELET # BLD AUTO: 280 10E3/UL (ref 150–450)
POTASSIUM SERPL-SCNC: 3.4 MMOL/L (ref 3.4–5.3)
RBC # BLD AUTO: 4.65 10E6/UL (ref 3.8–5.2)
RBC URINE: 2 /HPF
SODIUM SERPL-SCNC: 142 MMOL/L (ref 135–145)
SP GR UR STRIP: 1.01 (ref 1–1.03)
UROBILINOGEN UR STRIP-MCNC: NORMAL MG/DL
WBC # BLD AUTO: 8.5 10E3/UL (ref 4–11)
WBC URINE: 4 /HPF

## 2024-12-22 PROCEDURE — 96361 HYDRATE IV INFUSION ADD-ON: CPT

## 2024-12-22 PROCEDURE — 250N000011 HC RX IP 250 OP 636: Performed by: EMERGENCY MEDICINE

## 2024-12-22 PROCEDURE — 99285 EMERGENCY DEPT VISIT HI MDM: CPT | Mod: 25

## 2024-12-22 PROCEDURE — 96374 THER/PROPH/DIAG INJ IV PUSH: CPT

## 2024-12-22 PROCEDURE — 250N000013 HC RX MED GY IP 250 OP 250 PS 637: Performed by: EMERGENCY MEDICINE

## 2024-12-22 PROCEDURE — 80048 BASIC METABOLIC PNL TOTAL CA: CPT | Performed by: EMERGENCY MEDICINE

## 2024-12-22 PROCEDURE — 36415 COLL VENOUS BLD VENIPUNCTURE: CPT | Performed by: EMERGENCY MEDICINE

## 2024-12-22 PROCEDURE — 85025 COMPLETE CBC W/AUTO DIFF WBC: CPT | Performed by: EMERGENCY MEDICINE

## 2024-12-22 PROCEDURE — 81003 URINALYSIS AUTO W/O SCOPE: CPT | Performed by: EMERGENCY MEDICINE

## 2024-12-22 PROCEDURE — 74176 CT ABD & PELVIS W/O CONTRAST: CPT

## 2024-12-22 PROCEDURE — 96375 TX/PRO/DX INJ NEW DRUG ADDON: CPT

## 2024-12-22 PROCEDURE — 258N000003 HC RX IP 258 OP 636: Performed by: EMERGENCY MEDICINE

## 2024-12-22 RX ORDER — HYDROCODONE BITARTRATE AND ACETAMINOPHEN 5; 325 MG/1; MG/1
1 TABLET ORAL EVERY 6 HOURS PRN
Qty: 10 TABLET | Refills: 0 | Status: SHIPPED | OUTPATIENT
Start: 2024-12-22 | End: 2024-12-25

## 2024-12-22 RX ORDER — HYDROCODONE BITARTRATE AND ACETAMINOPHEN 5; 325 MG/1; MG/1
1 TABLET ORAL ONCE
Status: COMPLETED | OUTPATIENT
Start: 2024-12-22 | End: 2024-12-22

## 2024-12-22 RX ORDER — KETOROLAC TROMETHAMINE 15 MG/ML
15 INJECTION, SOLUTION INTRAMUSCULAR; INTRAVENOUS ONCE
Status: DISCONTINUED | OUTPATIENT
Start: 2024-12-22 | End: 2024-12-23 | Stop reason: HOSPADM

## 2024-12-22 RX ORDER — ONDANSETRON 4 MG/1
4 TABLET, ORALLY DISINTEGRATING ORAL EVERY 8 HOURS PRN
Qty: 10 TABLET | Refills: 0 | Status: SHIPPED | OUTPATIENT
Start: 2024-12-22 | End: 2024-12-25

## 2024-12-22 RX ORDER — MORPHINE SULFATE 4 MG/ML
4 INJECTION, SOLUTION INTRAMUSCULAR; INTRAVENOUS ONCE
Status: DISCONTINUED | OUTPATIENT
Start: 2024-12-22 | End: 2024-12-23 | Stop reason: HOSPADM

## 2024-12-22 RX ORDER — ONDANSETRON 2 MG/ML
4 INJECTION INTRAMUSCULAR; INTRAVENOUS
Status: COMPLETED | OUTPATIENT
Start: 2024-12-22 | End: 2024-12-22

## 2024-12-22 RX ORDER — KETOROLAC TROMETHAMINE 15 MG/ML
15 INJECTION, SOLUTION INTRAMUSCULAR; INTRAVENOUS ONCE
Status: COMPLETED | OUTPATIENT
Start: 2024-12-22 | End: 2024-12-22

## 2024-12-22 RX ORDER — HYDROMORPHONE HYDROCHLORIDE 1 MG/ML
0.5 INJECTION, SOLUTION INTRAMUSCULAR; INTRAVENOUS; SUBCUTANEOUS ONCE
Status: COMPLETED | OUTPATIENT
Start: 2024-12-22 | End: 2024-12-22

## 2024-12-22 RX ORDER — ONDANSETRON 2 MG/ML
4 INJECTION INTRAMUSCULAR; INTRAVENOUS EVERY 30 MIN PRN
Status: DISCONTINUED | OUTPATIENT
Start: 2024-12-22 | End: 2024-12-23 | Stop reason: HOSPADM

## 2024-12-22 RX ORDER — TAMSULOSIN HYDROCHLORIDE 0.4 MG/1
0.4 CAPSULE ORAL DAILY
Qty: 3 CAPSULE | Refills: 0 | Status: SHIPPED | OUTPATIENT
Start: 2024-12-22 | End: 2024-12-25

## 2024-12-22 RX ADMIN — ONDANSETRON 4 MG: 2 INJECTION INTRAMUSCULAR; INTRAVENOUS at 22:35

## 2024-12-22 RX ADMIN — HYDROCODONE BITARTRATE AND ACETAMINOPHEN 1 TABLET: 5; 325 TABLET ORAL at 22:49

## 2024-12-22 RX ADMIN — SODIUM CHLORIDE 1000 ML: 9 INJECTION, SOLUTION INTRAVENOUS at 21:54

## 2024-12-22 RX ADMIN — KETOROLAC TROMETHAMINE 15 MG: 15 INJECTION, SOLUTION INTRAMUSCULAR; INTRAVENOUS at 21:07

## 2024-12-22 RX ADMIN — HYDROMORPHONE HYDROCHLORIDE 0.5 MG: 1 INJECTION, SOLUTION INTRAMUSCULAR; INTRAVENOUS; SUBCUTANEOUS at 21:07

## 2024-12-22 ASSESSMENT — ACTIVITIES OF DAILY LIVING (ADL)
ADLS_ACUITY_SCORE: 43
ADLS_ACUITY_SCORE: 43

## 2024-12-23 NOTE — ED TRIAGE NOTES
Pt presents to triage with c/o L flank pain, onset 1 hour ago, hx kidney stones, this feels similar. Nausea but no vomiting. No meds PTA.

## 2024-12-23 NOTE — ED PROVIDER NOTES
"  Emergency Department Note      History of Present Illness     Chief Complaint   Flank Pain    HPI   Kiran Espino is a 64 year old female presenting with left sided flank pain that onset suddenly. The patient notes the pain has radiated around to the abdomen. Her pain was 10/10, but has improved to 2/10 after pain medications. She patient endorses nausea, chills and episodes of feeling hot. Kiran denies vomiting, difficulty urinating, or hematuria. No recent trauma. Of note, the patient has a history of right sided kidney stones, but notes her current pain is worse in comparison.     Independent Historian    as detailed above.    Review of External Notes   I reviewed the ED note from 3/25/24.     Past Medical History     Medical History and Problem List   Palpitations    Melanoma   DVT    Medications   The patient is not currently taking any regular medications.      Surgical History   Endoscopic dissection femoral lymph node   Excise mass groin   Head and neck surgery  Lithotripsy  Colonoscopy   Lumpectomy     Physical Exam     Patient Vitals for the past 24 hrs:   BP Temp Pulse Resp SpO2 Height Weight   12/22/24 2220 131/82 -- 74 -- 100 % -- --   12/22/24 2210 -- -- -- -- 96 % -- --   12/22/24 2200 129/78 -- 65 -- 94 % -- --   12/22/24 2145 137/78 -- 51 -- 93 % -- --   12/22/24 2130 -- -- -- -- 100 % -- --   12/22/24 2125 -- -- -- -- 99 % -- --   12/22/24 2120 117/61 -- 52 -- 100 % -- --   12/22/24 2115 -- -- -- -- 100 % -- --   12/22/24 2110 107/60 -- 83 -- -- -- --   12/22/24 2042 114/54 98.9  F (37.2  C) 70 18 100 % 1.6 m (5' 3\") 60.8 kg (134 lb)     Physical Exam  General: The patient is alert, in no respiratory distress. Appears uncomfortable.     HENT: Mucous membranes moist.    Cardiovascular: Regular rate and rhythm. Good pulses in all four extremities. Normal capillary refill and skin turgor.     Respiratory: Lungs are clear. No nasal flaring. No retractions. No wheezing, no " crackles.    Gastrointestinal: Abdomen soft. No guarding, no rebound. No palpable hernias. No abdominal tenderness.     Musculoskeletal: No gross deformity.     Skin: No rashes or petechiae.     Neurologic: The patient is alert and oriented x3. GCS 15. No testable cranial nerve deficit. Follows commands with clear and appropriate speech. Gives appropriate answers. Good strength in all extremities. No gross neurologic deficit. Gross sensation intact. Pupils are round and reactive. No meningismus.     Lymphatic: No cervical adenopathy. No lower extremity swelling.    Psychiatric: The patient is non-tearful.    Diagnostics     Lab Results   Labs Ordered and Resulted from Time of ED Arrival to Time of ED Departure   ROUTINE UA WITH MICROSCOPIC REFLEX TO CULTURE - Abnormal       Result Value    Color Urine Straw      Appearance Urine Clear      Glucose Urine Negative      Bilirubin Urine Negative      Ketones Urine Negative      Specific Gravity Urine 1.010      Blood Urine Trace (*)     pH Urine 8.5 (*)     Protein Albumin Urine Negative      Urobilinogen Urine Normal      Nitrite Urine Negative      Leukocyte Esterase Urine Negative      Bacteria Urine Few (*)     Mucus Urine Present (*)     RBC Urine 2      WBC Urine 4     BASIC METABOLIC PANEL - Abnormal    Sodium 142      Potassium 3.4      Chloride 104      Carbon Dioxide (CO2) 18 (*)     Anion Gap 20 (*)     Urea Nitrogen 20.0      Creatinine 0.96 (*)     GFR Estimate 67      Calcium 11.4 (*)     Glucose 129 (*)    CBC WITH PLATELETS AND DIFFERENTIAL    WBC Count 8.5      RBC Count 4.65      Hemoglobin 13.7      Hematocrit 39.2      MCV 84      MCH 29.5      MCHC 34.9      RDW 12.5      Platelet Count 280      % Neutrophils 52      % Lymphocytes 37      % Monocytes 9      % Eosinophils 2      % Basophils 1      % Immature Granulocytes 0      NRBCs per 100 WBC 0      Absolute Neutrophils 4.4      Absolute Lymphocytes 3.2      Absolute Monocytes 0.8      Absolute  Eosinophils 0.1      Absolute Basophils 0.1      Absolute Immature Granulocytes 0.0      Absolute NRBCs 0.0       Imaging   CT Abdomen Pelvis w/o Contrast   Final Result   IMPRESSION:    1.  3 mm stone within the distal left ureter, with associated proximal ureteral wall thickening and periureteral inflammatory fat stranding. No hydronephrosis. Correlation with urinalysis is recommended to exclude an ascending left urinary tract    infection.   2.  Multiple additional bilateral nonobstructing renal stones.           Independent Interpretation   None    ED Course      Medications Administered   Medications   ketorolac (TORADOL) injection 15 mg (0 mg Intravenous Hold 12/22/24 2158)   morphine (PF) injection 4 mg (0 mg Intravenous Hold 12/22/24 2158)   ondansetron (ZOFRAN) injection 4 mg (has no administration in time range)   ondansetron (ZOFRAN) injection 4 mg (4 mg Intravenous $Given 12/22/24 2235)   ketorolac (TORADOL) injection 15 mg (15 mg Intravenous $Given 12/22/24 2107)   HYDROmorphone (PF) (DILAUDID) injection 0.5 mg (0.5 mg Intravenous $Given 12/22/24 2107)   sodium chloride 0.9% BOLUS 1,000 mL (0 mLs Intravenous Stopped 12/22/24 2226)   HYDROcodone-acetaminophen (NORCO) 5-325 MG per tablet 1 tablet (1 tablet Oral $Given 12/22/24 2249)     Procedures   Procedures     Discussion of Management   None    ED Course   ED Course as of 12/22/24 2315   Sun Dec 22, 2024   2128 I obtained the history and examined the patient as noted above.      2215 I rechecked and updated the patient.        Additional Documentation  None    Medical Decision Making / Diagnosis     CMS Diagnoses:     MIPS       None    MDM   Kiran Espino is a 64 year old female who presented with acute onset of left flank pain very similar to her previous kidney stone.  I was concerned because the previous kidney stone required intervention that was quite large the patient however pain did come down quite rapidly CT scan was performed evaluate the  size and location is quite distal 3 mm in size medic the chance of the patient passing it is quite high the patient is aware she went to return if any problems she is otherwise stable there is no signs of a UTI or infected stone and she was discharged in good condition.    Disposition   The patient was discharged.     Diagnosis     ICD-10-CM    1. Ureterolithiasis  N20.1       2. Acute right flank pain  R10.9          Discharge Medications   Discharge Medication List as of 12/22/2024 10:26 PM        START taking these medications    Details   HYDROcodone-acetaminophen (NORCO) 5-325 MG tablet Take 1 tablet by mouth every 6 hours as needed for severe pain., Disp-10 tablet, R-0, Local Print      ondansetron (ZOFRAN ODT) 4 MG ODT tab Take 1 tablet (4 mg) by mouth every 8 hours as needed for nausea., Disp-10 tablet, R-0, Local Print      tamsulosin (FLOMAX) 0.4 MG capsule Take 1 capsule (0.4 mg) by mouth daily for 3 days., Disp-3 capsule, R-0, Local Print           Scribe Disclosure:  I, Carmela Madera, am serving as a scribe at 9:35 PM on 12/22/2024 to document services personally performed by Elian Bermeo MD based on my observations and the provider's statements to me.        Elian Bermeo MD  12/22/24 [id number]

## 2025-02-10 ENCOUNTER — PATIENT OUTREACH (OUTPATIENT)
Dept: CARE COORDINATION | Facility: CLINIC | Age: 65
End: 2025-02-10
Payer: COMMERCIAL

## 2025-02-18 ENCOUNTER — OFFICE VISIT (OUTPATIENT)
Dept: FAMILY MEDICINE | Facility: CLINIC | Age: 65
End: 2025-02-18
Payer: COMMERCIAL

## 2025-02-18 VITALS
OXYGEN SATURATION: 99 % | WEIGHT: 133 LBS | DIASTOLIC BLOOD PRESSURE: 72 MMHG | TEMPERATURE: 96.9 F | RESPIRATION RATE: 18 BRPM | HEART RATE: 86 BPM | SYSTOLIC BLOOD PRESSURE: 100 MMHG | BODY MASS INDEX: 23.56 KG/M2

## 2025-02-18 DIAGNOSIS — J06.9 VIRAL UPPER RESPIRATORY ILLNESS: Primary | ICD-10-CM

## 2025-02-18 PROCEDURE — 99213 OFFICE O/P EST LOW 20 MIN: CPT

## 2025-02-18 PROCEDURE — G2211 COMPLEX E/M VISIT ADD ON: HCPCS

## 2025-02-18 NOTE — PROGRESS NOTES
Assessment & Plan     Viral upper respiratory illness  Symptoms have been improving as expected with viral illnesses and she feels as though she is almost back to her normal self. Offered strep swab given her sore throat, but with symptoms improving every day, she would like to decline this today which is certainly reasonable. Vitals and exam today are within normal limits. Encouraged patient to continue taking aspirin or tylenol/ibuprofen as needed, drink plenty of fluids and prioritize getting good rest. No other interventions needed at this time.     Patient should follow up in 1-2 weeks if symptoms do not improve or sooner for new or worsening symptoms. All questions answered to patient's satisfaction. Warning signs of when to seek emergency care were discussed.     Milly Montano PA-C      Subjective   Kiran is a 64 year old, presenting for the following health issues:  exposure (To influenza A)        2/18/2025     1:35 PM   Additional Questions   Roomed by Rosi LANGLEY   Accompanied by self     History of Present Illness       Reason for visit:  Flu?   She is taking medications regularly.     Kiran is a 64 year old female who presents today with URI symptoms for the last week.     Symptoms have been steadily improving and are near gone today. Her  was diagnosed with influenza A yesterday and he thought she should come in to be seen.     She reports a mild sore throat that has greatly improved over the last week. Cough is pretty much over. Was productive, but now has hardly coughed at all. Every once in awhile will feel a tightness in her chest and will cough but nothing comes up. No fevers, chills, or body aches.     Has been taking aspirin which has been helpful.     Acute Illness  Acute illness concerns: cough congestion- neg covid  Onset/Duration: 1 week  Symptoms:  Fever: in the begining  Chills/Sweats: some in the begining  Headache (location?): No  Sinus Pressure: YES in the begining  Conjunctivitis:   No  Ear Pain: no  Rhinorrhea: little  Congestion: chest now is almost gone  Sore Throat: in the begining  Cough: litlle  Wheeze: No  Decreased Appetite: No  Nausea: No  Vomiting: No  Diarrhea: No  Dysuria/Freq.: No  Dysuria or Hematuria: No  Fatigue/Achiness: in the begining  Sick/Strep Exposure: YES  Therapies tried and outcome: aspirin    Review of Systems  Constitutional, neuro, ENT, endocrine, pulmonary, cardiac, gastrointestinal, genitourinary, musculoskeletal, integument and psychiatric systems are negative, except as otherwise noted.      Objective    /72   Pulse 86   Temp 96.9  F (36.1  C) (Tympanic)   Resp 18   Wt 60.3 kg (133 lb)   LMP 01/20/2013   SpO2 99%   BMI 23.56 kg/m    Body mass index is 23.56 kg/m .  Physical Exam   GENERAL: alert and no distress  EYES: Eyes grossly normal to inspection, PERRL and conjunctivae and sclerae normal  HENT: ear canals and TM's normal, nose and mouth without ulcers or lesions  NECK: no adenopathy, no asymmetry, masses, or scars  RESP: lungs clear to auscultation - no rales, rhonchi or wheezes  CV: regular rate and rhythm, normal S1 S2, no S3 or S4, no murmur, click or rub, no peripheral edema  MS: no gross musculoskeletal defects noted, no edema  SKIN: no suspicious lesions or rashes  PSYCH: mentation appears normal, affect normal/bright      Signed Electronically by: Milly Montano PA-C

## 2025-02-24 ENCOUNTER — PATIENT OUTREACH (OUTPATIENT)
Dept: CARE COORDINATION | Facility: CLINIC | Age: 65
End: 2025-02-24
Payer: COMMERCIAL

## 2025-04-19 ENCOUNTER — HEALTH MAINTENANCE LETTER (OUTPATIENT)
Age: 65
End: 2025-04-19

## (undated) DEVICE — SOL NACL 0.9% IRRIG 3000ML BAG 2B7477

## (undated) DEVICE — FIBER LASER 200 UM DISPOSABLE TFL-FBX200S

## (undated) DEVICE — SHEATH URETERAL ACCESS NAVIGATOR HD 12/14FRX36CM M0062502250

## (undated) DEVICE — PREP SCRUB SOL EXIDINE 4% CHG 4OZ 29002-404

## (undated) DEVICE — GUIDEWIRE URO STR STIFF .035"X150CM NITINOL 150NSS35

## (undated) DEVICE — SOL WATER IRRIG 1000ML BOTTLE 2F7114

## (undated) DEVICE — GLOVE BIOGEL PI ULTRATOUCH SZ 7.5 41175

## (undated) DEVICE — LINEN HALF SHEET 5512

## (undated) DEVICE — COVER FOOTSWITCH W/CINCH 20X24" 923267

## (undated) DEVICE — BAG CLEAR TRASH 1.3M 39X33" P4040C

## (undated) DEVICE — PACK CYSTO CUSTOM RIDGES

## (undated) DEVICE — TUBING IRRIG TUR Y TYPE 96" LF 6543-01

## (undated) DEVICE — CATH URETERAL FLEX TIP TIGERTAIL 06FRX70CM 139006

## (undated) DEVICE — BASKET NITINOL TIPLESS HALO  1.5FRX120CM 554120

## (undated) DEVICE — LINEN FULL SHEET 5511

## (undated) RX ORDER — ONDANSETRON 2 MG/ML
INJECTION INTRAMUSCULAR; INTRAVENOUS
Status: DISPENSED
Start: 2024-03-15

## (undated) RX ORDER — LIDOCAINE HYDROCHLORIDE 10 MG/ML
INJECTION, SOLUTION EPIDURAL; INFILTRATION; INTRACAUDAL; PERINEURAL
Status: DISPENSED
Start: 2024-03-15

## (undated) RX ORDER — CEFAZOLIN SODIUM/WATER 2 G/20 ML
SYRINGE (ML) INTRAVENOUS
Status: DISPENSED
Start: 2024-03-15

## (undated) RX ORDER — DEXAMETHASONE SODIUM PHOSPHATE 4 MG/ML
INJECTION, SOLUTION INTRA-ARTICULAR; INTRALESIONAL; INTRAMUSCULAR; INTRAVENOUS; SOFT TISSUE
Status: DISPENSED
Start: 2024-03-15

## (undated) RX ORDER — FENTANYL CITRATE 50 UG/ML
INJECTION, SOLUTION INTRAMUSCULAR; INTRAVENOUS
Status: DISPENSED
Start: 2024-03-15

## (undated) RX ORDER — PROPOFOL 10 MG/ML
INJECTION, EMULSION INTRAVENOUS
Status: DISPENSED
Start: 2024-03-15